# Patient Record
Sex: FEMALE | Race: ASIAN | NOT HISPANIC OR LATINO | Employment: OTHER | URBAN - METROPOLITAN AREA
[De-identification: names, ages, dates, MRNs, and addresses within clinical notes are randomized per-mention and may not be internally consistent; named-entity substitution may affect disease eponyms.]

---

## 2017-04-13 ENCOUNTER — HOSPITAL ENCOUNTER (OUTPATIENT)
Dept: LAB | Facility: MEDICAL CENTER | Age: 82
End: 2017-04-13
Attending: FAMILY MEDICINE
Payer: MEDICARE

## 2017-04-13 LAB
25(OH)D3 SERPL-MCNC: 7 NG/ML (ref 30–100)
ALBUMIN SERPL BCP-MCNC: 3.9 G/DL (ref 3.2–4.9)
ALBUMIN/GLOB SERPL: 1.3 G/DL
ALP SERPL-CCNC: 90 U/L (ref 30–99)
ALT SERPL-CCNC: 8 U/L (ref 2–50)
ANION GAP SERPL CALC-SCNC: 7 MMOL/L (ref 0–11.9)
AST SERPL-CCNC: 16 U/L (ref 12–45)
BASOPHILS # BLD AUTO: 0.2 % (ref 0–1.8)
BASOPHILS # BLD: 0.01 K/UL (ref 0–0.12)
BILIRUB SERPL-MCNC: 0.6 MG/DL (ref 0.1–1.5)
BUN SERPL-MCNC: 15 MG/DL (ref 8–22)
CALCIUM SERPL-MCNC: 9 MG/DL (ref 8.5–10.5)
CHLORIDE SERPL-SCNC: 106 MMOL/L (ref 96–112)
CHOLEST SERPL-MCNC: 198 MG/DL (ref 100–199)
CO2 SERPL-SCNC: 27 MMOL/L (ref 20–33)
CREAT SERPL-MCNC: 0.63 MG/DL (ref 0.5–1.4)
EOSINOPHIL # BLD AUTO: 0.12 K/UL (ref 0–0.51)
EOSINOPHIL NFR BLD: 2.4 % (ref 0–6.9)
ERYTHROCYTE [DISTWIDTH] IN BLOOD BY AUTOMATED COUNT: 45.7 FL (ref 35.9–50)
EST. AVERAGE GLUCOSE BLD GHB EST-MCNC: 154 MG/DL
GFR SERPL CREATININE-BSD FRML MDRD: >60 ML/MIN/1.73 M 2
GLOBULIN SER CALC-MCNC: 3 G/DL (ref 1.9–3.5)
GLUCOSE SERPL-MCNC: 136 MG/DL (ref 65–99)
HBA1C MFR BLD: 7 % (ref 0–5.6)
HCT VFR BLD AUTO: 43.5 % (ref 37–47)
HDLC SERPL-MCNC: 47 MG/DL
HGB BLD-MCNC: 14.2 G/DL (ref 12–16)
IMM GRANULOCYTES # BLD AUTO: 0.01 K/UL (ref 0–0.11)
IMM GRANULOCYTES NFR BLD AUTO: 0.2 % (ref 0–0.9)
LDLC SERPL CALC-MCNC: 130 MG/DL
LYMPHOCYTES # BLD AUTO: 1.42 K/UL (ref 1–4.8)
LYMPHOCYTES NFR BLD: 28.5 % (ref 22–41)
MCH RBC QN AUTO: 31.6 PG (ref 27–33)
MCHC RBC AUTO-ENTMCNC: 32.6 G/DL (ref 33.6–35)
MCV RBC AUTO: 96.9 FL (ref 81.4–97.8)
MONOCYTES # BLD AUTO: 0.41 K/UL (ref 0–0.85)
MONOCYTES NFR BLD AUTO: 8.2 % (ref 0–13.4)
NEUTROPHILS # BLD AUTO: 3.01 K/UL (ref 2–7.15)
NEUTROPHILS NFR BLD: 60.5 % (ref 44–72)
NRBC # BLD AUTO: 0 K/UL
NRBC BLD AUTO-RTO: 0 /100 WBC
PLATELET # BLD AUTO: 216 K/UL (ref 164–446)
PMV BLD AUTO: 10.5 FL (ref 9–12.9)
POTASSIUM SERPL-SCNC: 3.9 MMOL/L (ref 3.6–5.5)
PROT SERPL-MCNC: 6.9 G/DL (ref 6–8.2)
RBC # BLD AUTO: 4.49 M/UL (ref 4.2–5.4)
SODIUM SERPL-SCNC: 140 MMOL/L (ref 135–145)
TRIGL SERPL-MCNC: 107 MG/DL (ref 0–149)
WBC # BLD AUTO: 5 K/UL (ref 4.8–10.8)

## 2017-04-13 PROCEDURE — 85025 COMPLETE CBC W/AUTO DIFF WBC: CPT

## 2017-04-13 PROCEDURE — 80053 COMPREHEN METABOLIC PANEL: CPT

## 2017-04-13 PROCEDURE — 80061 LIPID PANEL: CPT

## 2017-04-13 PROCEDURE — 82306 VITAMIN D 25 HYDROXY: CPT

## 2017-04-13 PROCEDURE — 83036 HEMOGLOBIN GLYCOSYLATED A1C: CPT

## 2017-04-13 PROCEDURE — 36415 COLL VENOUS BLD VENIPUNCTURE: CPT

## 2017-09-05 ENCOUNTER — APPOINTMENT (OUTPATIENT)
Dept: SOCIAL WORK | Facility: CLINIC | Age: 82
End: 2017-09-05
Payer: MEDICARE

## 2017-09-05 PROCEDURE — 90662 IIV NO PRSV INCREASED AG IM: CPT | Performed by: REGISTERED NURSE

## 2017-09-05 PROCEDURE — G0008 ADMIN INFLUENZA VIRUS VAC: HCPCS | Performed by: REGISTERED NURSE

## 2017-10-09 ENCOUNTER — HOSPITAL ENCOUNTER (OUTPATIENT)
Dept: LAB | Facility: MEDICAL CENTER | Age: 82
End: 2017-10-09
Attending: FAMILY MEDICINE
Payer: MEDICARE

## 2017-10-09 LAB
25(OH)D3 SERPL-MCNC: 9 NG/ML (ref 30–100)
ALBUMIN SERPL BCP-MCNC: 4 G/DL (ref 3.2–4.9)
ALBUMIN/GLOB SERPL: 1.2 G/DL
ALP SERPL-CCNC: 80 U/L (ref 30–99)
ALT SERPL-CCNC: 11 U/L (ref 2–50)
ANION GAP SERPL CALC-SCNC: 6 MMOL/L (ref 0–11.9)
AST SERPL-CCNC: 18 U/L (ref 12–45)
BILIRUB SERPL-MCNC: 0.8 MG/DL (ref 0.1–1.5)
BUN SERPL-MCNC: 16 MG/DL (ref 8–22)
CALCIUM SERPL-MCNC: 9.5 MG/DL (ref 8.5–10.5)
CHLORIDE SERPL-SCNC: 104 MMOL/L (ref 96–112)
CO2 SERPL-SCNC: 29 MMOL/L (ref 20–33)
CREAT SERPL-MCNC: 0.57 MG/DL (ref 0.5–1.4)
EST. AVERAGE GLUCOSE BLD GHB EST-MCNC: 160 MG/DL
GFR SERPL CREATININE-BSD FRML MDRD: >60 ML/MIN/1.73 M 2
GLOBULIN SER CALC-MCNC: 3.4 G/DL (ref 1.9–3.5)
GLUCOSE SERPL-MCNC: 128 MG/DL (ref 65–99)
HBA1C MFR BLD: 7.2 % (ref 0–5.6)
POTASSIUM SERPL-SCNC: 3.8 MMOL/L (ref 3.6–5.5)
PROT SERPL-MCNC: 7.4 G/DL (ref 6–8.2)
SODIUM SERPL-SCNC: 139 MMOL/L (ref 135–145)
TSH SERPL DL<=0.005 MIU/L-ACNC: 2.53 UIU/ML (ref 0.3–3.7)

## 2017-10-09 PROCEDURE — 83036 HEMOGLOBIN GLYCOSYLATED A1C: CPT

## 2017-10-09 PROCEDURE — 36415 COLL VENOUS BLD VENIPUNCTURE: CPT

## 2017-10-09 PROCEDURE — 84443 ASSAY THYROID STIM HORMONE: CPT

## 2017-10-09 PROCEDURE — 80053 COMPREHEN METABOLIC PANEL: CPT

## 2017-10-09 PROCEDURE — 82306 VITAMIN D 25 HYDROXY: CPT

## 2017-12-09 ENCOUNTER — HOSPITAL ENCOUNTER (EMERGENCY)
Facility: MEDICAL CENTER | Age: 82
End: 2017-12-09
Attending: EMERGENCY MEDICINE
Payer: MEDICARE

## 2017-12-09 ENCOUNTER — APPOINTMENT (OUTPATIENT)
Dept: RADIOLOGY | Facility: MEDICAL CENTER | Age: 82
End: 2017-12-09
Attending: EMERGENCY MEDICINE
Payer: MEDICARE

## 2017-12-09 VITALS
WEIGHT: 101.41 LBS | RESPIRATION RATE: 18 BRPM | OXYGEN SATURATION: 98 % | BODY MASS INDEX: 19.15 KG/M2 | TEMPERATURE: 98 F | HEIGHT: 61 IN | DIASTOLIC BLOOD PRESSURE: 78 MMHG | HEART RATE: 63 BPM | SYSTOLIC BLOOD PRESSURE: 115 MMHG

## 2017-12-09 DIAGNOSIS — S20.212A CHEST WALL CONTUSION, LEFT, INITIAL ENCOUNTER: ICD-10-CM

## 2017-12-09 PROCEDURE — 99284 EMERGENCY DEPT VISIT MOD MDM: CPT

## 2017-12-09 PROCEDURE — 71101 X-RAY EXAM UNILAT RIBS/CHEST: CPT | Mod: RT

## 2017-12-09 RX ORDER — HYDROCODONE BITARTRATE AND ACETAMINOPHEN 5; 325 MG/1; MG/1
1-2 TABLET ORAL EVERY 6 HOURS PRN
Qty: 20 TAB | Refills: 0 | Status: SHIPPED | OUTPATIENT
Start: 2017-12-09 | End: 2017-12-21

## 2017-12-09 RX ORDER — DONEPEZIL HYDROCHLORIDE 5 MG/1
5 TABLET, FILM COATED ORAL DAILY
Status: SHIPPED | COMMUNITY
End: 2017-12-09

## 2017-12-09 ASSESSMENT — PAIN SCALES - GENERAL
PAINLEVEL_OUTOF10: 1
PAINLEVEL_OUTOF10: 5

## 2017-12-09 NOTE — ED NOTES
The Medication Reconciliation process has been completed by interviewing the patient's family and a call to the pharmacy where she hasn't filled her metformin since May or the Cerefolin since October but they state that she is taking them.    Allergies have been reviewed  Antibiotic use in 30 days - none    Home Pharmacy:  [facility] [place]

## 2017-12-09 NOTE — DISCHARGE INSTRUCTIONS
Chest Contusion  A contusion is a deep bruise. Bruises happen when an injury causes bleeding under the skin. Signs of bruising include pain, puffiness (swelling), and discolored skin. The bruise may turn blue, purple, or yellow.   HOME CARE  · Put ice on the injured area.  ¨ Put ice in a plastic bag.  ¨ Place a towel between the skin and the bag.  ¨ Leave the ice on for 15-20 minutes at a time, 03-04 times a day for the first 48 hours.  · Only take medicine as told by your doctor.  · Rest.  · Take deep breaths (deep-breathing exercises) as told by your doctor.  · Stop smoking if you smoke.  · Do not lift objects over 5 pounds (2.3 kilograms) for 3 days or longer if told by your doctor.  GET HELP RIGHT AWAY IF:   · You have more bruising or puffiness.  · You have pain that gets worse.  · You have trouble breathing.  · You are dizzy, weak, or pass out (faint).  · You have blood in your pee (urine) or poop (stool).  · You cough up or throw up (vomit) blood.  · Your puffiness or pain is not helped with medicines.  MAKE SURE YOU:   · Understand these instructions.  · Will watch your condition.  · Will get help right away if you are not doing well or get worse.     This information is not intended to replace advice given to you by your health care provider. Make sure you discuss any questions you have with your health care provider.     Document Released: 06/05/2009 Document Revised: 09/11/2013 Document Reviewed: 06/10/2013  [employer] Interactive Patient Education ©2016 [employer]

## 2017-12-09 NOTE — ED PROVIDER NOTES
ED Provider Note    CHIEF COMPLAINT  Chief Complaint   Patient presents with   • Chest Wall Pain   • GLF        HPI  Daniel Aleman is a 89 y.o. female who presentsTo the ED with complaints of right chest wall pain. About 2 days ago. The patient was up, stumbled and fell and hit her back on a dresser drawer. Since then, she's been having increasing pain primarily around the posterior aspect of the mid chest wall. There is no shortness of breath, no fevers, no chills, or any other symptoms. Patient apparently has a slight amount of dementia. Per the . She is a poor historian. Patient is alert to person, place but not time or events.    REVIEW OF SYSTEMS  See HPI for further details. All other systems are negative.     PAST MEDICAL HISTORY  Past Medical History:   Diagnosis Date   • High Cholesterol    • Trigeminal Neuralgia        FAMILY HISTORY  No family history on file.  Patient's family history has been discussed and is been found to be noncontributory to his present illness  SOCIAL HISTORY  Social History     Social History   • Marital status:      Spouse name: N/A   • Number of children: N/A   • Years of education: N/A     Social History Main Topics   • Smoking status: Never Smoker   • Smokeless tobacco: Not on file   • Alcohol use No   • Drug use: No   • Sexual activity: Not on file     Other Topics Concern   • Not on file     Social History Narrative   • No narrative on file      Daisy Espinoza M.D.    SURGICAL HISTORY  Past Surgical History:   Procedure Laterality Date   • GYN SURGERY       x 3       CURRENT MEDICATIONS  Home Medications     Reviewed by Paco Rivera (Pharmacy Tech) on 17 at 1146  Med List Status: Complete   Medication Last Dose Status   metformin (GLUCOPHAGE) 500 MG Tab 2017 Active   Rwzqvtxqa-Opaxlvwpf-Wknhbleiev (CEREFOLIN NAC PO) 2017 Active              No current facility-administered medications on file prior to encounter.      No current  "outpatient prescriptions on file prior to encounter.         ALLERGIES  No Known Allergies    PHYSICAL EXAM  VITAL SIGNS: /63   Pulse 63   Temp 36.7 °C (98 °F)   Resp 20   Ht 1.549 m (5' 1\")   Wt 46 kg (101 lb 6.6 oz)   SpO2 97%   BMI 19.16 kg/m²    Pulse Oximetry was obtained. It showed a reading of Pulse Oximetry: 96 %.  I interpreted this as not hypoxic.     Constitutional: Well developed, Well nourished, No acute distress, Non-toxic appearance.   HENT: Normocephalic, Atraumatic, Bilateral external ears normal, bilateral tympanic membranes normal, Oropharynx moist, No oral exudates, Nose normal.   Eyes: Pupils are equal round and react to light, extraocular motions are intact, conjunctiva is normal, there are no signs of exudate.   Neck: Supple, no cervical lymphadenopathy, no meningeal signs..   Lymphatic: No lymphadenopathy noted.   Cardiovascular: Regular rate and rhythm without murmurs gallops or rubs.   Thorax & Lungs: Lungs are clear to auscultation bilaterally, there are no wheezes no rales. Chest wall is tender about the right mid aspect posterior axillary line around the 5th, 6th, 7th ribs region. There is no crepitance felt. Breath sounds are equal   Abdomen: Soft, nontender nondistended. Bowel sounds are present.   Skin: Warm, Dry, No erythema,   Back: No tenderness, No CVA tenderness.   Musculoskeletal: Good range of motion in all major joints. No tenderness to palpation or major deformities noted. Intact distal pulses, no clubbing, no cyanosis, no edema,   Neurologic: Alert & oriented x 2. Apparently baseline, Normal motor function, Normal sensory function, No focal deficits noted.         RADIOLOGY/PROCEDURES  AN-QOJW-RGPNVTSJKT (WITH 1-VIEW CXR) RIGHT   Final Result      1.  No rib fracture identified      2.  Mild bilateral atelectasis      3.  Small right pleural effusion            COURSE & MEDICAL DECISION MAKING  Pertinent Labs & Imaging studies reviewed. (See chart for " details)  Patient presents for evaluation. Clinically, she does have some mild tenderness to the right aspect, but there is no crepitance. Chest x-ray there is a small, very small pleural effusion on the right sides. Possible at this is just fluid collection from an injury. There is no overt signs of fractures, fracture cannot be completely ruled out. At this point will treat with pain control. Recommended deep breathing exercises. Follow-up primary care physician 1 week for recheck, return as needed    FINAL IMPRESSION  1. Chest wall contusion, left, initial encounter      I reviewed prescription monitoring program for patient's narcotic use before prescribing a scheduled drug.The patient will not drink alcohol nor drive with prescribed medications. The patient will return for new or worsening symptoms and is stable at the time of discharge.    The patient is referred to a primary physician for blood pressure management, diabetic screening, and for all other preventative health concerns.    DISPOSITION:  Patient will be discharged home in stable condition.    FOLLOW UP:  Daisy Espinoza M.D.  [address]  [place] [address]  [phone]    Schedule an appointment as soon as possible for a visit in 1 week  As needed, Return if any symptoms worsen      OUTPATIENT MEDICATIONS:  New Prescriptions    HYDROCODONE-ACETAMINOPHEN (NORCO) 5-325 MG TAB PER TABLET    Take 1-2 Tabs by mouth every 6 hours as needed.         It was noticed that the patient's blood pressure was greater than 120/80. On today's visit. At this point, most likely related to reactive hypertension, secondary to the emergency visit itself. I have Recommend the patient followup with their primary care physician for recheck of her blood pressure.      Electronically signed by: Tang Burroughs, 12/9/2017 11:05 AM

## 2017-12-09 NOTE — ED NOTES
Pt BIB  c/o tripping and hitting her R back against a dresser 2 days ago. Pt denies LOC. Pt c/o pain to R back, R to lateral chest wall. Pt denies SOB.

## 2017-12-21 ENCOUNTER — HOSPITAL ENCOUNTER (EMERGENCY)
Facility: MEDICAL CENTER | Age: 82
End: 2017-12-21
Attending: EMERGENCY MEDICINE
Payer: MEDICARE

## 2017-12-21 VITALS
BODY MASS INDEX: 19.04 KG/M2 | WEIGHT: 100.75 LBS | DIASTOLIC BLOOD PRESSURE: 67 MMHG | OXYGEN SATURATION: 93 % | HEART RATE: 85 BPM | RESPIRATION RATE: 16 BRPM | SYSTOLIC BLOOD PRESSURE: 160 MMHG | TEMPERATURE: 98.2 F

## 2017-12-21 DIAGNOSIS — K59.00 CONSTIPATION, UNSPECIFIED CONSTIPATION TYPE: ICD-10-CM

## 2017-12-21 PROCEDURE — 99283 EMERGENCY DEPT VISIT LOW MDM: CPT

## 2017-12-21 RX ORDER — GLUCOSAMINE HCL 500 MG
1 TABLET ORAL DAILY
Status: SHIPPED | COMMUNITY
End: 2020-08-03

## 2017-12-21 ASSESSMENT — PAIN SCALES - GENERAL: PAINLEVEL_OUTOF10: 5

## 2017-12-21 NOTE — ED NOTES
Pt had good relief after enema. D/c pt home, . Pt  And family aware of f/u instructions , aware to return for any changes or concerns. No further questions upon d/c home from ed

## 2017-12-21 NOTE — DISCHARGE INSTRUCTIONS
Constipation, Adult  Constipation is when a person has fewer than three bowel movements a week, has difficulty having a bowel movement, or has stools that are dry, hard, or larger than normal. As people grow older, constipation is more common. A low-fiber diet, not taking in enough fluids, and taking certain medicines may make constipation worse.   CAUSES   · Certain medicines, such as antidepressants, pain medicine, iron supplements, antacids, and water pills.    · Certain diseases, such as diabetes, irritable bowel syndrome (IBS), thyroid disease, or depression.    · Not drinking enough water.    · Not eating enough fiber-rich foods.    · Stress or travel.    · Lack of physical activity or exercise.    · Ignoring the urge to have a bowel movement.    · Using laxatives too much.    SIGNS AND SYMPTOMS   · Having fewer than three bowel movements a week.    · Straining to have a bowel movement.    · Having stools that are hard, dry, or larger than normal.    · Feeling full or bloated.    · Pain in the lower abdomen.    · Not feeling relief after having a bowel movement.    DIAGNOSIS   Your health care provider will take a medical history and perform a physical exam. Further testing may be done for severe constipation. Some tests may include:  · A barium enema X-ray to examine your rectum, colon, and, sometimes, your small intestine.    · A sigmoidoscopy to examine your lower colon.    · A colonoscopy to examine your entire colon.  TREATMENT   Treatment will depend on the severity of your constipation and what is causing it. Some dietary treatments include drinking more fluids and eating more fiber-rich foods. Lifestyle treatments may include regular exercise. If these diet and lifestyle recommendations do not help, your health care provider may recommend taking over-the-counter laxative medicines to help you have bowel movements. Prescription medicines may be prescribed if over-the-counter medicines do not work.    HOME CARE INSTRUCTIONS   · Eat foods that have a lot of fiber, such as fruits, vegetables, whole grains, and beans.  · Limit foods high in fat and processed sugars, such as french fries, hamburgers, cookies, candies, and soda.    · A fiber supplement may be added to your diet if you cannot get enough fiber from foods.    · Drink enough fluids to keep your urine clear or pale yellow.    · Exercise regularly or as directed by your health care provider.    · Go to the restroom when you have the urge to go. Do not hold it.    · Only take over-the-counter or prescription medicines as directed by your health care provider. Do not take other medicines for constipation without talking to your health care provider first.    SEEK IMMEDIATE MEDICAL CARE IF:   · You have bright red blood in your stool.    · Your constipation lasts for more than 4 days or gets worse.    · You have abdominal or rectal pain.    · You have thin, pencil-like stools.    · You have unexplained weight loss.  MAKE SURE YOU:   · Understand these instructions.  · Will watch your condition.  · Will get help right away if you are not doing well or get worse.     This information is not intended to replace advice given to you by your health care provider. Make sure you discuss any questions you have with your health care provider.     Document Released: 09/15/2005 Document Revised: 01/08/2016 Document Reviewed: 09/29/2014  [employer] Interactive Patient Education ©2016 [employer]    Fiber Content in Foods  Drinking plenty of fluids and consuming foods high in fiber can help with constipation. See the list below for the fiber content of some common foods.  Starches and Grains / Dietary Fiber (g)  · Cheerios, 1 cup / 3 g  · Barnard's Corn Flakes, 1 cup / 0.7 g  · Rice Krispies, 1 ¼ cup / 0.3 g  · [employer] Oat Life Cereal, ¾ cup / 2.1 g  · Oatmeal, instant (cooked), ½ cup / 2 g  · Chanelle's Frosted Mini Wheats, 1 cup / 5.1 g  · Rice, brown, long-grain  (cooked), 1 cup / 3.5 g  · Rice, white, long-grain (cooked), 1 cup / 0.6 g  · Macaroni, cooked, enriched, 1 cup / 2.5 g  Legumes / Dietary Fiber (g)  · Beans, baked, canned, plain or vegetarian, ½ cup / 5.2 g  · Beans, kidney, canned, ½ cup / 6.8 g  · Beans, sutherland, dried (cooked), ½ cup / 7.7 g  · Beans, sutherland, canned, ½ cup / 5.5 g  Breads and Crackers / Dietary Fiber (g)  · Harrison crackers, plain or honey, 2 squares / 0.7 g  · Saltine crackers, 3 squares / 0.3 g  · Pretzels, plain, salted, 10 pieces / 1.8 g  · Bread, whole-wheat, 1 slice / 1.9 g  · Bread, white, 1 slice / 0.7 g  · Bread, raisin, 1 slice / 1.2 g  · Bagel, plain, 3 oz / 2 g  · Tortilla, flour, 1 oz / 0.9 g  · Tortilla, corn, 1 small / 1.5 g  · Bun, hamburger or hotdog, 1 small / 0.9 g  Fruits / Dietary Fiber (g)  · Apple, raw with skin, 1 medium / 4.4 g  · Applesauce, sweetened, ½ cup / 1.5 g  · Banana, ½ medium / 1.5 g  · Grapes, 10 grapes / 0.4 g  · Orange, 1 small / 2.3 g  · Raisin, 1.5 oz / 1.6 g  · Melon, 1 cup / 1.4 g  Vegetables / Dietary Fiber (g)  · Green beans, canned, ½ cup / 1.3 g  · Carrots (cooked), ½ cup / 2.3 g  · Broccoli (cooked), ½ cup / 2.8 g  · Peas, frozen (cooked), ½ cup / 4.4 g  · Potatoes, mashed, ½ cup / 1.6 g  · Lettuce, 1 cup / 0.5 g  · Corn, canned, ½ cup / 1.6 g  · Tomato, ½ cup / 1.1 g  Document Released: 05/05/2008 Document Revised: 03/11/2013 Document Reviewed: 06/30/2008  [employer]® Patient Information ©2014 [employer].

## 2018-04-10 ENCOUNTER — HOSPITAL ENCOUNTER (OUTPATIENT)
Dept: LAB | Facility: MEDICAL CENTER | Age: 83
End: 2018-04-10
Attending: FAMILY MEDICINE
Payer: MEDICARE

## 2018-04-10 LAB
25(OH)D3 SERPL-MCNC: 52 NG/ML (ref 30–100)
BASOPHILS # BLD AUTO: 0.7 % (ref 0–1.8)
BASOPHILS # BLD: 0.04 K/UL (ref 0–0.12)
EOSINOPHIL # BLD AUTO: 0.13 K/UL (ref 0–0.51)
EOSINOPHIL NFR BLD: 2.3 % (ref 0–6.9)
ERYTHROCYTE [DISTWIDTH] IN BLOOD BY AUTOMATED COUNT: 49.5 FL (ref 35.9–50)
EST. AVERAGE GLUCOSE BLD GHB EST-MCNC: 134 MG/DL
HBA1C MFR BLD: 6.3 % (ref 0–5.6)
HCT VFR BLD AUTO: 43 % (ref 37–47)
HGB BLD-MCNC: 13.7 G/DL (ref 12–16)
IMM GRANULOCYTES # BLD AUTO: 0.01 K/UL (ref 0–0.11)
IMM GRANULOCYTES NFR BLD AUTO: 0.2 % (ref 0–0.9)
LYMPHOCYTES # BLD AUTO: 1.77 K/UL (ref 1–4.8)
LYMPHOCYTES NFR BLD: 31.5 % (ref 22–41)
MCH RBC QN AUTO: 31.9 PG (ref 27–33)
MCHC RBC AUTO-ENTMCNC: 31.9 G/DL (ref 33.6–35)
MCV RBC AUTO: 100 FL (ref 81.4–97.8)
MONOCYTES # BLD AUTO: 0.48 K/UL (ref 0–0.85)
MONOCYTES NFR BLD AUTO: 8.5 % (ref 0–13.4)
NEUTROPHILS # BLD AUTO: 3.19 K/UL (ref 2–7.15)
NEUTROPHILS NFR BLD: 56.8 % (ref 44–72)
NRBC # BLD AUTO: 0 K/UL
NRBC BLD-RTO: 0 /100 WBC
PLATELET # BLD AUTO: 196 K/UL (ref 164–446)
PMV BLD AUTO: 10.6 FL (ref 9–12.9)
RBC # BLD AUTO: 4.3 M/UL (ref 4.2–5.4)
T4 FREE SERPL-MCNC: 0.91 NG/DL (ref 0.53–1.43)
TSH SERPL DL<=0.005 MIU/L-ACNC: 1.85 UIU/ML (ref 0.38–5.33)
WBC # BLD AUTO: 5.6 K/UL (ref 4.8–10.8)

## 2018-04-10 PROCEDURE — 84443 ASSAY THYROID STIM HORMONE: CPT

## 2018-04-10 PROCEDURE — 84439 ASSAY OF FREE THYROXINE: CPT

## 2018-04-10 PROCEDURE — 36415 COLL VENOUS BLD VENIPUNCTURE: CPT

## 2018-04-10 PROCEDURE — 85025 COMPLETE CBC W/AUTO DIFF WBC: CPT

## 2018-04-10 PROCEDURE — 83036 HEMOGLOBIN GLYCOSYLATED A1C: CPT

## 2018-04-10 PROCEDURE — 82306 VITAMIN D 25 HYDROXY: CPT

## 2018-04-10 PROCEDURE — 80053 COMPREHEN METABOLIC PANEL: CPT

## 2018-04-11 LAB
ALBUMIN SERPL BCP-MCNC: 4.3 G/DL (ref 3.2–4.9)
ALBUMIN/GLOB SERPL: 1.9 G/DL
ALP SERPL-CCNC: 72 U/L (ref 30–99)
ALT SERPL-CCNC: 8 U/L (ref 2–50)
ANION GAP SERPL CALC-SCNC: 6 MMOL/L (ref 0–11.9)
AST SERPL-CCNC: 18 U/L (ref 12–45)
BILIRUB SERPL-MCNC: 0.5 MG/DL (ref 0.1–1.5)
BUN SERPL-MCNC: 21 MG/DL (ref 8–22)
CALCIUM SERPL-MCNC: 9.4 MG/DL (ref 8.5–10.5)
CHLORIDE SERPL-SCNC: 106 MMOL/L (ref 96–112)
CO2 SERPL-SCNC: 28 MMOL/L (ref 20–33)
CREAT SERPL-MCNC: 0.74 MG/DL (ref 0.5–1.4)
GLOBULIN SER CALC-MCNC: 2.3 G/DL (ref 1.9–3.5)
GLUCOSE SERPL-MCNC: 111 MG/DL (ref 65–99)
POTASSIUM SERPL-SCNC: 3.8 MMOL/L (ref 3.6–5.5)
PROT SERPL-MCNC: 6.6 G/DL (ref 6–8.2)
SODIUM SERPL-SCNC: 140 MMOL/L (ref 135–145)

## 2018-09-25 ENCOUNTER — HOSPITAL ENCOUNTER (OUTPATIENT)
Dept: LAB | Facility: MEDICAL CENTER | Age: 83
End: 2018-09-25
Attending: FAMILY MEDICINE
Payer: MEDICARE

## 2018-09-25 LAB
ALBUMIN SERPL BCP-MCNC: 3.5 G/DL (ref 3.2–4.9)
ALBUMIN/GLOB SERPL: 1 G/DL
ALP SERPL-CCNC: 71 U/L (ref 30–99)
ALT SERPL-CCNC: 8 U/L (ref 2–50)
ANION GAP SERPL CALC-SCNC: 5 MMOL/L (ref 0–11.9)
AST SERPL-CCNC: 14 U/L (ref 12–45)
BASOPHILS # BLD AUTO: 0.6 % (ref 0–1.8)
BASOPHILS # BLD: 0.03 K/UL (ref 0–0.12)
BILIRUB SERPL-MCNC: 0.5 MG/DL (ref 0.1–1.5)
BUN SERPL-MCNC: 16 MG/DL (ref 8–22)
CALCIUM SERPL-MCNC: 9.5 MG/DL (ref 8.5–10.5)
CHLORIDE SERPL-SCNC: 106 MMOL/L (ref 96–112)
CHOLEST SERPL-MCNC: 171 MG/DL (ref 100–199)
CO2 SERPL-SCNC: 32 MMOL/L (ref 20–33)
CREAT SERPL-MCNC: 0.64 MG/DL (ref 0.5–1.4)
CREAT UR-MCNC: 31 MG/DL
EOSINOPHIL # BLD AUTO: 0.15 K/UL (ref 0–0.51)
EOSINOPHIL NFR BLD: 3.1 % (ref 0–6.9)
ERYTHROCYTE [DISTWIDTH] IN BLOOD BY AUTOMATED COUNT: 46.8 FL (ref 35.9–50)
EST. AVERAGE GLUCOSE BLD GHB EST-MCNC: 143 MG/DL
FASTING STATUS PATIENT QL REPORTED: NORMAL
GLOBULIN SER CALC-MCNC: 3.5 G/DL (ref 1.9–3.5)
GLUCOSE SERPL-MCNC: 106 MG/DL (ref 65–99)
HBA1C MFR BLD: 6.6 % (ref 0–5.6)
HCT VFR BLD AUTO: 40.2 % (ref 37–47)
HDLC SERPL-MCNC: 51 MG/DL
HGB BLD-MCNC: 13.2 G/DL (ref 12–16)
IMM GRANULOCYTES # BLD AUTO: 0.02 K/UL (ref 0–0.11)
IMM GRANULOCYTES NFR BLD AUTO: 0.4 % (ref 0–0.9)
LDLC SERPL CALC-MCNC: 101 MG/DL
LYMPHOCYTES # BLD AUTO: 1.54 K/UL (ref 1–4.8)
LYMPHOCYTES NFR BLD: 32.3 % (ref 22–41)
MCH RBC QN AUTO: 32.3 PG (ref 27–33)
MCHC RBC AUTO-ENTMCNC: 32.8 G/DL (ref 33.6–35)
MCV RBC AUTO: 98.3 FL (ref 81.4–97.8)
MICROALBUMIN UR-MCNC: <0.7 MG/DL
MICROALBUMIN/CREAT UR: NORMAL MG/G (ref 0–30)
MONOCYTES # BLD AUTO: 0.42 K/UL (ref 0–0.85)
MONOCYTES NFR BLD AUTO: 8.8 % (ref 0–13.4)
NEUTROPHILS # BLD AUTO: 2.61 K/UL (ref 2–7.15)
NEUTROPHILS NFR BLD: 54.8 % (ref 44–72)
NRBC # BLD AUTO: 0 K/UL
NRBC BLD-RTO: 0 /100 WBC
PLATELET # BLD AUTO: 191 K/UL (ref 164–446)
PMV BLD AUTO: 11 FL (ref 9–12.9)
POTASSIUM SERPL-SCNC: 3.7 MMOL/L (ref 3.6–5.5)
PROT SERPL-MCNC: 7 G/DL (ref 6–8.2)
RBC # BLD AUTO: 4.09 M/UL (ref 4.2–5.4)
SODIUM SERPL-SCNC: 143 MMOL/L (ref 135–145)
T4 FREE SERPL-MCNC: 0.81 NG/DL (ref 0.53–1.43)
TRIGL SERPL-MCNC: 96 MG/DL (ref 0–149)
TSH SERPL DL<=0.005 MIU/L-ACNC: 2.98 UIU/ML (ref 0.38–5.33)
WBC # BLD AUTO: 4.8 K/UL (ref 4.8–10.8)

## 2018-09-25 PROCEDURE — 82570 ASSAY OF URINE CREATININE: CPT

## 2018-09-25 PROCEDURE — 82043 UR ALBUMIN QUANTITATIVE: CPT

## 2018-09-25 PROCEDURE — 36415 COLL VENOUS BLD VENIPUNCTURE: CPT

## 2018-09-25 PROCEDURE — 80053 COMPREHEN METABOLIC PANEL: CPT

## 2018-09-25 PROCEDURE — 84443 ASSAY THYROID STIM HORMONE: CPT

## 2018-09-25 PROCEDURE — 83036 HEMOGLOBIN GLYCOSYLATED A1C: CPT

## 2018-09-25 PROCEDURE — 80061 LIPID PANEL: CPT

## 2018-09-25 PROCEDURE — 84439 ASSAY OF FREE THYROXINE: CPT

## 2018-09-25 PROCEDURE — 85025 COMPLETE CBC W/AUTO DIFF WBC: CPT

## 2018-12-10 ENCOUNTER — DOCUMENTATION (OUTPATIENT)
Dept: CARDIOLOGY | Facility: MEDICAL CENTER | Age: 83
End: 2018-12-10

## 2018-12-10 NOTE — PROGRESS NOTES
Request sent to Dr. Espinoza's office requesting recent OV note and all cardiac testing on file for pt. Under media tab pt was seen in 2009 by SW filed under [employer].

## 2018-12-12 ENCOUNTER — OFFICE VISIT (OUTPATIENT)
Dept: CARDIOLOGY | Facility: MEDICAL CENTER | Age: 83
End: 2018-12-12
Payer: MEDICARE

## 2018-12-12 VITALS
DIASTOLIC BLOOD PRESSURE: 60 MMHG | HEART RATE: 68 BPM | WEIGHT: 105 LBS | RESPIRATION RATE: 16 BRPM | OXYGEN SATURATION: 93 % | BODY MASS INDEX: 20.62 KG/M2 | SYSTOLIC BLOOD PRESSURE: 140 MMHG | HEIGHT: 60 IN

## 2018-12-12 DIAGNOSIS — R94.31 NONSPECIFIC ABNORMAL ELECTROCARDIOGRAM (ECG) (EKG): ICD-10-CM

## 2018-12-12 DIAGNOSIS — R01.1 HEART MURMUR: ICD-10-CM

## 2018-12-12 PROBLEM — R73.03 PRE-DIABETES: Status: ACTIVE | Noted: 2018-12-12

## 2018-12-12 PROCEDURE — 99204 OFFICE O/P NEW MOD 45 MIN: CPT | Performed by: INTERNAL MEDICINE

## 2018-12-12 RX ORDER — CALCIUM POLYCARBOPHIL 625 MG 625 MG/1
625 TABLET ORAL DAILY
COMMUNITY
End: 2020-08-03

## 2018-12-12 ASSESSMENT — ENCOUNTER SYMPTOMS
DOUBLE VISION: 0
BLOOD IN STOOL: 0
NECK PAIN: 0
ORTHOPNEA: 0
MEMORY LOSS: 1
WEAKNESS: 0
FOCAL WEAKNESS: 0
SHORTNESS OF BREATH: 0
NERVOUS/ANXIOUS: 0
SENSORY CHANGE: 0
WEIGHT LOSS: 0
BACK PAIN: 0
HEADACHES: 0
VOMITING: 0
DEPRESSION: 0
NAUSEA: 0
PALPITATIONS: 0
BRUISES/BLEEDS EASILY: 0

## 2018-12-12 NOTE — LETTER
[facility] [place] [facility]   [address]  [place] [address]  Phone: [phone]  Fax: [phone]              Daniel Aleman  1927    Encounter Date: 2018    Chad Aguero M.D.          PROGRESS NOTE:  Chief Complaint   Patient presents with   • Abnormal EKG       Subjective:   Daniel Aleman is a 89 y.o. female who presents today for evaluation of abnormal EKG    She is seen in consultation at the request of Dr. Daisy Espinoza for above issue.  She reports no known major medical problem.  She was recently seen by her primary care provider for a routine medical checkup.  An electrocardiogram was performed and there was concern about possible septal infarct.  The patient denies any cardiac symptoms.  She is somewhat sedentary but denies chest discomfort or shortness of breath.  She was seen by cardiologist in  for chest pain.  Electrocardiogram reportedly showed nonspecific inferior Q waves and relatively tall R waves in the anterior precordial leads lasting some concern about inferior wall infarct with posterior wall extension.  She subsequently underwent an exercise stress test which was negative.  She also underwent echocardiography at that time which was unremarkable.    Past Medical History:   Diagnosis Date   • High cholesterol    • Trigeminal neuralgia      Past Surgical History:   Procedure Laterality Date   • GYN SURGERY       x 3   • PRIMARY C SECTION           Family History   Problem Relation Age of Onset   • Heart Disease Neg Hx      Social History     Social History   • Marital status:      Spouse name: N/A   • Number of children: N/A   • Years of education: N/A     Occupational History   • Not on file.     Social History Main Topics   • Smoking status: Never Smoker   • Smokeless tobacco: Never Used   • Alcohol use No   • Drug use: No   • Sexual activity: Not on file     Other Topics Concern   • Not on file          Social History Narrative   • No narrative on file     Not on File  Outpatient Encounter Prescriptions as of 12/12/2018   Medication Sig Dispense Refill   • calcium polycarbophil (FIBERCON) 625 MG Tab Take 625 mg by mouth every day.     • Cholecalciferol (VITAMIN D3) 3000 units Tab Take 1 Cap by mouth every day.     • metformin (GLUCOPHAGE) 500 MG Tab Take 500 mg by mouth every day.     • Yfarqnddq-Xitumoydd-Nmbvgkmbjw (CEREFOLIN NAC PO) Take 1 Tab by mouth 1 time daily as needed.       No facility-administered encounter medications on file as of 12/12/2018.      Review of Systems   Constitutional: Negative for malaise/fatigue and weight loss.   HENT: Positive for hearing loss. Negative for congestion, nosebleeds and tinnitus.    Eyes: Negative for double vision.   Respiratory: Negative for shortness of breath.    Cardiovascular: Negative for chest pain, palpitations, orthopnea and leg swelling.   Gastrointestinal: Negative for blood in stool, nausea and vomiting.   Genitourinary: Negative for frequency and hematuria.   Musculoskeletal: Negative for back pain and neck pain.   Skin: Negative for rash.   Neurological: Negative for sensory change, focal weakness, weakness and headaches.   Endo/Heme/Allergies: Does not bruise/bleed easily.   Psychiatric/Behavioral: Positive for memory loss. Negative for depression. The patient is not nervous/anxious.    All other systems reviewed and are negative.       Objective:   /60 (BP Location: Right arm)   Pulse 68   Resp 16   Ht 1.524 m (5')   Wt 47.6 kg (105 lb)   SpO2 93%   BMI 20.51 kg/m²      Physical Exam   Constitutional: She is oriented to person, place, and time. No distress.   HENT:   Head: Normocephalic and atraumatic.   Eyes: EOM are normal.   Neck: No JVD present. No thyromegaly present.   Cardiovascular: Normal rate and regular rhythm.  Exam reveals no gallop.    Murmur heard.   Systolic murmur is present with a grade of 2/6   RUSB   Pulmonary/Chest:  Effort normal. No respiratory distress. She has no wheezes. She has no rales.   Abdominal: Soft. There is no tenderness.   Musculoskeletal: She exhibits no edema or deformity.   Neurological: She is alert and oriented to person, place, and time.   Skin: Skin is warm. No erythema.   Psychiatric: Her behavior is normal.     I did review her electrocardiogram from November 19 the QRS complex in lead V2 was very small. Tthere was no definite Q wave but the entire QS complex was only 1.5-2 mm.  There was late transition and low voltage in the limb leads without other significant Q waves noted.    Assessment:     1. Nonspecific abnormal electrocardiogram (ECG) (EKG)  EC-ECHOCARDIOGRAM COMPLETE W/O CONT   2. Pre-diabetes     3. Heart murmur  EC-ECHOCARDIOGRAM COMPLETE W/O CONT       Medical Decision Making:  Today's Assessment / Status / Plan:     I believe the finding on her EKG is nonspecific, probably age related pulmonary emphysema rather than prior septal infarct. With heart murmur and her age, will obtain echocardiography to assess wall motion and aortic valve.  I did not start her on a new medication.  We will keep you posted about our finding and further recommendation as it becomes available.  Thank you for allowing us to participate in the care of this patient.        No Recipients

## 2018-12-12 NOTE — PROGRESS NOTES
Chief Complaint   Patient presents with   • Abnormal EKG       Subjective:   Daniel Aleman is a 89 y.o. female who presents today for evaluation of abnormal EKG    She is seen in consultation at the request of Dr. Daisy Espinoza for above issue.  She reports no known major medical problem.  She was recently seen by her primary care provider for a routine medical checkup.  An electrocardiogram was performed and there was concern about possible septal infarct.  The patient denies any cardiac symptoms.  She is somewhat sedentary but denies chest discomfort or shortness of breath.  She was seen by cardiologist in  for chest pain.  Electrocardiogram reportedly showed nonspecific inferior Q waves and relatively tall R waves in the anterior precordial leads lasting some concern about inferior wall infarct with posterior wall extension.  She subsequently underwent an exercise stress test which was negative.  She also underwent echocardiography at that time which was unremarkable.    Past Medical History:   Diagnosis Date   • High cholesterol    • Trigeminal neuralgia      Past Surgical History:   Procedure Laterality Date   • GYN SURGERY       x 3   • PRIMARY C SECTION           Family History   Problem Relation Age of Onset   • Heart Disease Neg Hx      Social History     Social History   • Marital status:      Spouse name: N/A   • Number of children: N/A   • Years of education: N/A     Occupational History   • Not on file.     Social History Main Topics   • Smoking status: Never Smoker   • Smokeless tobacco: Never Used   • Alcohol use No   • Drug use: No   • Sexual activity: Not on file     Other Topics Concern   • Not on file     Social History Narrative   • No narrative on file     Not on File  Outpatient Encounter Prescriptions as of 2018   Medication Sig Dispense Refill   • calcium polycarbophil (FIBERCON) 625 MG Tab Take 625 mg by mouth every day.     • Cholecalciferol (VITAMIN D3) 3000  units Tab Take 1 Cap by mouth every day.     • metformin (GLUCOPHAGE) 500 MG Tab Take 500 mg by mouth every day.     • Ilamjbwds-Pevtuwemp-Zxjhmtpkqp (CEREFOLIN NAC PO) Take 1 Tab by mouth 1 time daily as needed.       No facility-administered encounter medications on file as of 12/12/2018.      Review of Systems   Constitutional: Negative for malaise/fatigue and weight loss.   HENT: Positive for hearing loss. Negative for congestion, nosebleeds and tinnitus.    Eyes: Negative for double vision.   Respiratory: Negative for shortness of breath.    Cardiovascular: Negative for chest pain, palpitations, orthopnea and leg swelling.   Gastrointestinal: Negative for blood in stool, nausea and vomiting.   Genitourinary: Negative for frequency and hematuria.   Musculoskeletal: Negative for back pain and neck pain.   Skin: Negative for rash.   Neurological: Negative for sensory change, focal weakness, weakness and headaches.   Endo/Heme/Allergies: Does not bruise/bleed easily.   Psychiatric/Behavioral: Positive for memory loss. Negative for depression. The patient is not nervous/anxious.    All other systems reviewed and are negative.       Objective:   /60 (BP Location: Right arm)   Pulse 68   Resp 16   Ht 1.524 m (5')   Wt 47.6 kg (105 lb)   SpO2 93%   BMI 20.51 kg/m²     Physical Exam   Constitutional: She is oriented to person, place, and time. No distress.   HENT:   Head: Normocephalic and atraumatic.   Eyes: EOM are normal.   Neck: No JVD present. No thyromegaly present.   Cardiovascular: Normal rate and regular rhythm.  Exam reveals no gallop.    Murmur heard.   Systolic murmur is present with a grade of 2/6   RUSB   Pulmonary/Chest: Effort normal. No respiratory distress. She has no wheezes. She has no rales.   Abdominal: Soft. There is no tenderness.   Musculoskeletal: She exhibits no edema or deformity.   Neurological: She is alert and oriented to person, place, and time.   Skin: Skin is warm. No  erythema.   Psychiatric: Her behavior is normal.     I did review her electrocardiogram from November 19 the QRS complex in lead V2 was very small. Tthere was no definite Q wave but the entire QS complex was only 1.5-2 mm.  There was late transition and low voltage in the limb leads without other significant Q waves noted.    Assessment:     1. Nonspecific abnormal electrocardiogram (ECG) (EKG)  EC-ECHOCARDIOGRAM COMPLETE W/O CONT   2. Pre-diabetes     3. Heart murmur  EC-ECHOCARDIOGRAM COMPLETE W/O CONT       Medical Decision Making:  Today's Assessment / Status / Plan:     I believe the finding on her EKG is nonspecific, probably age related pulmonary emphysema rather than prior septal infarct. With heart murmur and her age, will obtain echocardiography to assess wall motion and aortic valve.  I did not start her on a new medication.  We will keep you posted about our finding and further recommendation as it becomes available.  Thank you for allowing us to participate in the care of this patient.

## 2019-01-08 ENCOUNTER — HOSPITAL ENCOUNTER (OUTPATIENT)
Dept: CARDIOLOGY | Facility: MEDICAL CENTER | Age: 84
End: 2019-01-08
Attending: INTERNAL MEDICINE
Payer: MEDICARE

## 2019-01-08 DIAGNOSIS — R94.31 NONSPECIFIC ABNORMAL ELECTROCARDIOGRAM (ECG) (EKG): ICD-10-CM

## 2019-01-08 DIAGNOSIS — R01.1 HEART MURMUR: ICD-10-CM

## 2019-01-08 PROCEDURE — 93306 TTE W/DOPPLER COMPLETE: CPT

## 2019-01-08 PROCEDURE — 93306 TTE W/DOPPLER COMPLETE: CPT | Mod: 26 | Performed by: INTERNAL MEDICINE

## 2019-01-09 LAB
LV EJECT FRACT  99904: 70
LV EJECT FRACT MOD 2C 99903: 72.09
LV EJECT FRACT MOD 4C 99902: 66.68
LV EJECT FRACT MOD BP 99901: 68.55

## 2019-01-10 ENCOUNTER — TELEPHONE (OUTPATIENT)
Dept: CARDIOLOGY | Facility: MEDICAL CENTER | Age: 84
End: 2019-01-10

## 2019-01-10 NOTE — LETTER
January 10, 2019        Daniel Aleman  [address] [place] [address]          Dear Daniel,    We have received the results of your recent: Echocardiogram.    Your test came back unchanged or within normal limits.  Please follow up as previously discussed with your physician.      Feel free to call us with any questions.        Sincerely,          Shayla Aguero  Electronically Signed

## 2019-01-15 ENCOUNTER — HOSPITAL ENCOUNTER (OUTPATIENT)
Dept: RADIOLOGY | Facility: MEDICAL CENTER | Age: 84
End: 2019-01-15
Attending: FAMILY MEDICINE
Payer: MEDICARE

## 2019-01-15 DIAGNOSIS — M25.552 LEFT HIP PAIN: ICD-10-CM

## 2019-01-15 PROCEDURE — 73521 X-RAY EXAM HIPS BI 2 VIEWS: CPT

## 2019-04-19 ENCOUNTER — HOSPITAL ENCOUNTER (OUTPATIENT)
Dept: RADIOLOGY | Facility: MEDICAL CENTER | Age: 84
End: 2019-04-19
Attending: FAMILY MEDICINE
Payer: MEDICARE

## 2019-04-19 DIAGNOSIS — S99.922A INJURY OF LEFT FOOT, INITIAL ENCOUNTER: ICD-10-CM

## 2019-04-19 PROCEDURE — 73630 X-RAY EXAM OF FOOT: CPT | Mod: LT

## 2019-05-21 ENCOUNTER — HOSPITAL ENCOUNTER (OUTPATIENT)
Dept: LAB | Facility: MEDICAL CENTER | Age: 84
End: 2019-05-21
Attending: FAMILY MEDICINE
Payer: MEDICARE

## 2019-05-21 LAB
ALBUMIN SERPL BCP-MCNC: 3.7 G/DL (ref 3.2–4.9)
ALBUMIN/GLOB SERPL: 1.2 G/DL
ALP SERPL-CCNC: 73 U/L (ref 30–99)
ALT SERPL-CCNC: 5 U/L (ref 2–50)
ANION GAP SERPL CALC-SCNC: 8 MMOL/L (ref 0–11.9)
AST SERPL-CCNC: 14 U/L (ref 12–45)
BASOPHILS # BLD AUTO: 0.8 % (ref 0–1.8)
BASOPHILS # BLD: 0.04 K/UL (ref 0–0.12)
BILIRUB SERPL-MCNC: 0.6 MG/DL (ref 0.1–1.5)
BUN SERPL-MCNC: 13 MG/DL (ref 8–22)
CALCIUM SERPL-MCNC: 9 MG/DL (ref 8.5–10.5)
CHLORIDE SERPL-SCNC: 105 MMOL/L (ref 96–112)
CO2 SERPL-SCNC: 30 MMOL/L (ref 20–33)
CREAT SERPL-MCNC: 0.52 MG/DL (ref 0.5–1.4)
EOSINOPHIL # BLD AUTO: 0.13 K/UL (ref 0–0.51)
EOSINOPHIL NFR BLD: 2.6 % (ref 0–6.9)
ERYTHROCYTE [DISTWIDTH] IN BLOOD BY AUTOMATED COUNT: 48.3 FL (ref 35.9–50)
EST. AVERAGE GLUCOSE BLD GHB EST-MCNC: 134 MG/DL
GLOBULIN SER CALC-MCNC: 3.1 G/DL (ref 1.9–3.5)
GLUCOSE SERPL-MCNC: 100 MG/DL (ref 65–99)
HBA1C MFR BLD: 6.3 % (ref 0–5.6)
HCT VFR BLD AUTO: 42.8 % (ref 37–47)
HGB BLD-MCNC: 13.4 G/DL (ref 12–16)
IMM GRANULOCYTES # BLD AUTO: 0.01 K/UL (ref 0–0.11)
IMM GRANULOCYTES NFR BLD AUTO: 0.2 % (ref 0–0.9)
LYMPHOCYTES # BLD AUTO: 1.39 K/UL (ref 1–4.8)
LYMPHOCYTES NFR BLD: 27.3 % (ref 22–41)
MCH RBC QN AUTO: 30.9 PG (ref 27–33)
MCHC RBC AUTO-ENTMCNC: 31.3 G/DL (ref 33.6–35)
MCV RBC AUTO: 98.6 FL (ref 81.4–97.8)
MONOCYTES # BLD AUTO: 0.42 K/UL (ref 0–0.85)
MONOCYTES NFR BLD AUTO: 8.3 % (ref 0–13.4)
NEUTROPHILS # BLD AUTO: 3.1 K/UL (ref 2–7.15)
NEUTROPHILS NFR BLD: 60.8 % (ref 44–72)
NRBC # BLD AUTO: 0 K/UL
NRBC BLD-RTO: 0 /100 WBC
PLATELET # BLD AUTO: 221 K/UL (ref 164–446)
PMV BLD AUTO: 10.9 FL (ref 9–12.9)
POTASSIUM SERPL-SCNC: 3.4 MMOL/L (ref 3.6–5.5)
PROT SERPL-MCNC: 6.8 G/DL (ref 6–8.2)
RBC # BLD AUTO: 4.34 M/UL (ref 4.2–5.4)
SODIUM SERPL-SCNC: 143 MMOL/L (ref 135–145)
WBC # BLD AUTO: 5.1 K/UL (ref 4.8–10.8)

## 2019-05-21 PROCEDURE — 83036 HEMOGLOBIN GLYCOSYLATED A1C: CPT

## 2019-05-21 PROCEDURE — 85025 COMPLETE CBC W/AUTO DIFF WBC: CPT

## 2019-05-21 PROCEDURE — 80053 COMPREHEN METABOLIC PANEL: CPT

## 2019-05-21 PROCEDURE — 36415 COLL VENOUS BLD VENIPUNCTURE: CPT

## 2019-11-14 ENCOUNTER — HOSPITAL ENCOUNTER (OUTPATIENT)
Dept: LAB | Facility: MEDICAL CENTER | Age: 84
End: 2019-11-14
Attending: FAMILY MEDICINE
Payer: MEDICARE

## 2019-11-14 LAB
ALBUMIN SERPL BCP-MCNC: 3.8 G/DL (ref 3.2–4.9)
ALBUMIN/GLOB SERPL: 1.5 G/DL
ALP SERPL-CCNC: 68 U/L (ref 30–99)
ALT SERPL-CCNC: 12 U/L (ref 2–50)
ANION GAP SERPL CALC-SCNC: 9 MMOL/L (ref 0–11.9)
AST SERPL-CCNC: 17 U/L (ref 12–45)
BASOPHILS # BLD AUTO: 0.7 % (ref 0–1.8)
BASOPHILS # BLD: 0.04 K/UL (ref 0–0.12)
BILIRUB SERPL-MCNC: 0.3 MG/DL (ref 0.1–1.5)
BUN SERPL-MCNC: 22 MG/DL (ref 8–22)
CALCIUM SERPL-MCNC: 9 MG/DL (ref 8.5–10.5)
CHLORIDE SERPL-SCNC: 108 MMOL/L (ref 96–112)
CO2 SERPL-SCNC: 27 MMOL/L (ref 20–33)
CREAT SERPL-MCNC: 0.63 MG/DL (ref 0.5–1.4)
EOSINOPHIL # BLD AUTO: 0.2 K/UL (ref 0–0.51)
EOSINOPHIL NFR BLD: 3.5 % (ref 0–6.9)
ERYTHROCYTE [DISTWIDTH] IN BLOOD BY AUTOMATED COUNT: 50.1 FL (ref 35.9–50)
GLOBULIN SER CALC-MCNC: 2.6 G/DL (ref 1.9–3.5)
GLUCOSE SERPL-MCNC: 115 MG/DL (ref 65–99)
HCT VFR BLD AUTO: 40.4 % (ref 37–47)
HGB BLD-MCNC: 12.7 G/DL (ref 12–16)
IMM GRANULOCYTES # BLD AUTO: 0.01 K/UL (ref 0–0.11)
IMM GRANULOCYTES NFR BLD AUTO: 0.2 % (ref 0–0.9)
LYMPHOCYTES # BLD AUTO: 1.53 K/UL (ref 1–4.8)
LYMPHOCYTES NFR BLD: 26.6 % (ref 22–41)
MCH RBC QN AUTO: 31.4 PG (ref 27–33)
MCHC RBC AUTO-ENTMCNC: 31.4 G/DL (ref 33.6–35)
MCV RBC AUTO: 100 FL (ref 81.4–97.8)
MONOCYTES # BLD AUTO: 0.51 K/UL (ref 0–0.85)
MONOCYTES NFR BLD AUTO: 8.9 % (ref 0–13.4)
NEUTROPHILS # BLD AUTO: 3.47 K/UL (ref 2–7.15)
NEUTROPHILS NFR BLD: 60.1 % (ref 44–72)
NRBC # BLD AUTO: 0 K/UL
NRBC BLD-RTO: 0 /100 WBC
PLATELET # BLD AUTO: 199 K/UL (ref 164–446)
PMV BLD AUTO: 11 FL (ref 9–12.9)
POTASSIUM SERPL-SCNC: 4 MMOL/L (ref 3.6–5.5)
PROT SERPL-MCNC: 6.4 G/DL (ref 6–8.2)
RBC # BLD AUTO: 4.04 M/UL (ref 4.2–5.4)
SODIUM SERPL-SCNC: 144 MMOL/L (ref 135–145)
TSH SERPL DL<=0.005 MIU/L-ACNC: 2.59 UIU/ML (ref 0.38–5.33)
WBC # BLD AUTO: 5.8 K/UL (ref 4.8–10.8)

## 2019-11-14 PROCEDURE — 36415 COLL VENOUS BLD VENIPUNCTURE: CPT

## 2019-11-14 PROCEDURE — 85025 COMPLETE CBC W/AUTO DIFF WBC: CPT

## 2019-11-14 PROCEDURE — 84443 ASSAY THYROID STIM HORMONE: CPT

## 2019-11-14 PROCEDURE — 80053 COMPREHEN METABOLIC PANEL: CPT

## 2020-05-29 ENCOUNTER — HOSPITAL ENCOUNTER (OUTPATIENT)
Dept: LAB | Facility: MEDICAL CENTER | Age: 85
End: 2020-05-29
Attending: FAMILY MEDICINE
Payer: MEDICARE

## 2020-05-29 LAB
ALBUMIN SERPL BCP-MCNC: 3.8 G/DL (ref 3.2–4.9)
ALBUMIN/GLOB SERPL: 1.3 G/DL
ALP SERPL-CCNC: 75 U/L (ref 30–99)
ALT SERPL-CCNC: 12 U/L (ref 2–50)
ANION GAP SERPL CALC-SCNC: 12 MMOL/L (ref 7–16)
AST SERPL-CCNC: 17 U/L (ref 12–45)
BASOPHILS # BLD AUTO: 0.5 % (ref 0–1.8)
BASOPHILS # BLD: 0.03 K/UL (ref 0–0.12)
BILIRUB SERPL-MCNC: 0.6 MG/DL (ref 0.1–1.5)
BUN SERPL-MCNC: 23 MG/DL (ref 8–22)
CALCIUM SERPL-MCNC: 9 MG/DL (ref 8.5–10.5)
CHLORIDE SERPL-SCNC: 105 MMOL/L (ref 96–112)
CHOLEST SERPL-MCNC: 185 MG/DL (ref 100–199)
CO2 SERPL-SCNC: 25 MMOL/L (ref 20–33)
CREAT SERPL-MCNC: 0.78 MG/DL (ref 0.5–1.4)
EOSINOPHIL # BLD AUTO: 0.21 K/UL (ref 0–0.51)
EOSINOPHIL NFR BLD: 3.2 % (ref 0–6.9)
ERYTHROCYTE [DISTWIDTH] IN BLOOD BY AUTOMATED COUNT: 47.5 FL (ref 35.9–50)
EST. AVERAGE GLUCOSE BLD GHB EST-MCNC: 151 MG/DL
FASTING STATUS PATIENT QL REPORTED: NORMAL
GLOBULIN SER CALC-MCNC: 2.9 G/DL (ref 1.9–3.5)
GLUCOSE SERPL-MCNC: 109 MG/DL (ref 65–99)
HBA1C MFR BLD: 6.9 % (ref 0–5.6)
HCT VFR BLD AUTO: 41.9 % (ref 37–47)
HDLC SERPL-MCNC: 48 MG/DL
HGB BLD-MCNC: 13.2 G/DL (ref 12–16)
IMM GRANULOCYTES # BLD AUTO: 0.02 K/UL (ref 0–0.11)
IMM GRANULOCYTES NFR BLD AUTO: 0.3 % (ref 0–0.9)
LDLC SERPL CALC-MCNC: 117 MG/DL
LYMPHOCYTES # BLD AUTO: 1.97 K/UL (ref 1–4.8)
LYMPHOCYTES NFR BLD: 29.9 % (ref 22–41)
MCH RBC QN AUTO: 31.5 PG (ref 27–33)
MCHC RBC AUTO-ENTMCNC: 31.5 G/DL (ref 33.6–35)
MCV RBC AUTO: 100 FL (ref 81.4–97.8)
MONOCYTES # BLD AUTO: 0.55 K/UL (ref 0–0.85)
MONOCYTES NFR BLD AUTO: 8.3 % (ref 0–13.4)
NEUTROPHILS # BLD AUTO: 3.81 K/UL (ref 2–7.15)
NEUTROPHILS NFR BLD: 57.8 % (ref 44–72)
NRBC # BLD AUTO: 0 K/UL
NRBC BLD-RTO: 0 /100 WBC
PLATELET # BLD AUTO: 215 K/UL (ref 164–446)
PMV BLD AUTO: 10.6 FL (ref 9–12.9)
POTASSIUM SERPL-SCNC: 4.1 MMOL/L (ref 3.6–5.5)
PROT SERPL-MCNC: 6.7 G/DL (ref 6–8.2)
RBC # BLD AUTO: 4.19 M/UL (ref 4.2–5.4)
SODIUM SERPL-SCNC: 142 MMOL/L (ref 135–145)
TRIGL SERPL-MCNC: 100 MG/DL (ref 0–149)
TSH SERPL DL<=0.005 MIU/L-ACNC: 2.34 UIU/ML (ref 0.38–5.33)
WBC # BLD AUTO: 6.6 K/UL (ref 4.8–10.8)

## 2020-05-29 PROCEDURE — 36415 COLL VENOUS BLD VENIPUNCTURE: CPT

## 2020-05-29 PROCEDURE — 84443 ASSAY THYROID STIM HORMONE: CPT

## 2020-05-29 PROCEDURE — 80053 COMPREHEN METABOLIC PANEL: CPT

## 2020-05-29 PROCEDURE — 80061 LIPID PANEL: CPT

## 2020-05-29 PROCEDURE — 85025 COMPLETE CBC W/AUTO DIFF WBC: CPT

## 2020-05-29 PROCEDURE — 83036 HEMOGLOBIN GLYCOSYLATED A1C: CPT

## 2020-08-03 ENCOUNTER — APPOINTMENT (OUTPATIENT)
Dept: RADIOLOGY | Facility: MEDICAL CENTER | Age: 85
DRG: 536 | End: 2020-08-03
Attending: EMERGENCY MEDICINE
Payer: MEDICARE

## 2020-08-03 ENCOUNTER — HOSPITAL ENCOUNTER (INPATIENT)
Facility: MEDICAL CENTER | Age: 85
LOS: 2 days | DRG: 536 | End: 2020-08-05
Attending: EMERGENCY MEDICINE | Admitting: INTERNAL MEDICINE
Payer: MEDICARE

## 2020-08-03 DIAGNOSIS — S32.591A CLOSED FRACTURE OF RIGHT INFERIOR PUBIC RAMUS, INITIAL ENCOUNTER (HCC): ICD-10-CM
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
## 2020-08-03 DIAGNOSIS — S32.9XXA CLOSED NONDISPLACED FRACTURE OF PELVIS, UNSPECIFIED PART OF PELVIS, INITIAL ENCOUNTER (HCC): ICD-10-CM

## 2020-08-03 PROBLEM — E87.6 HYPOKALEMIA: Status: ACTIVE | Noted: 2020-08-03

## 2020-08-03 PROBLEM — S32.599A INFERIOR PUBIC RAMUS FRACTURE (HCC): Status: ACTIVE | Noted: 2020-08-03

## 2020-08-03 PROBLEM — F03.90 DEMENTIA (HCC): Status: ACTIVE | Noted: 2020-08-03

## 2020-08-03 LAB
25(OH)D3 SERPL-MCNC: 31 NG/ML (ref 30–100)
ALBUMIN SERPL BCP-MCNC: 3.5 G/DL (ref 3.2–4.9)
ALBUMIN/GLOB SERPL: 1.1 G/DL
ALP SERPL-CCNC: 73 U/L (ref 30–99)
ALT SERPL-CCNC: 6 U/L (ref 2–50)
ANION GAP SERPL CALC-SCNC: 9 MMOL/L (ref 7–16)
AST SERPL-CCNC: 14 U/L (ref 12–45)
BASOPHILS # BLD AUTO: 0.4 % (ref 0–1.8)
BASOPHILS # BLD: 0.03 K/UL (ref 0–0.12)
BILIRUB SERPL-MCNC: 0.8 MG/DL (ref 0.1–1.5)
BUN SERPL-MCNC: 23 MG/DL (ref 8–22)
CALCIUM SERPL-MCNC: 9.1 MG/DL (ref 8.4–10.2)
CHLORIDE SERPL-SCNC: 101 MMOL/L (ref 96–112)
CO2 SERPL-SCNC: 29 MMOL/L (ref 20–33)
CREAT SERPL-MCNC: 0.75 MG/DL (ref 0.5–1.4)
EOSINOPHIL # BLD AUTO: 0.12 K/UL (ref 0–0.51)
EOSINOPHIL NFR BLD: 1.5 % (ref 0–6.9)
ERYTHROCYTE [DISTWIDTH] IN BLOOD BY AUTOMATED COUNT: 46.4 FL (ref 35.9–50)
GLOBULIN SER CALC-MCNC: 3.3 G/DL (ref 1.9–3.5)
GLUCOSE SERPL-MCNC: 154 MG/DL (ref 65–99)
HCT VFR BLD AUTO: 41.2 % (ref 37–47)
HGB BLD-MCNC: 13.4 G/DL (ref 12–16)
IMM GRANULOCYTES # BLD AUTO: 0.03 K/UL (ref 0–0.11)
IMM GRANULOCYTES NFR BLD AUTO: 0.4 % (ref 0–0.9)
LYMPHOCYTES # BLD AUTO: 1.35 K/UL (ref 1–4.8)
LYMPHOCYTES NFR BLD: 16.7 % (ref 22–41)
MAGNESIUM SERPL-MCNC: 2 MG/DL (ref 1.5–2.5)
MCH RBC QN AUTO: 31.4 PG (ref 27–33)
MCHC RBC AUTO-ENTMCNC: 32.5 G/DL (ref 33.6–35)
MCV RBC AUTO: 96.5 FL (ref 81.4–97.8)
MONOCYTES # BLD AUTO: 0.62 K/UL (ref 0–0.85)
MONOCYTES NFR BLD AUTO: 7.7 % (ref 0–13.4)
NEUTROPHILS # BLD AUTO: 5.91 K/UL (ref 2–7.15)
NEUTROPHILS NFR BLD: 73.3 % (ref 44–72)
NRBC # BLD AUTO: 0 K/UL
NRBC BLD-RTO: 0 /100 WBC
PLATELET # BLD AUTO: 172 K/UL (ref 164–446)
PMV BLD AUTO: 9.9 FL (ref 9–12.9)
POTASSIUM SERPL-SCNC: 3.5 MMOL/L (ref 3.6–5.5)
PROT SERPL-MCNC: 6.8 G/DL (ref 6–8.2)
RBC # BLD AUTO: 4.27 M/UL (ref 4.2–5.4)
SODIUM SERPL-SCNC: 139 MMOL/L (ref 135–145)
WBC # BLD AUTO: 8.1 K/UL (ref 4.8–10.8)

## 2020-08-03 PROCEDURE — A9270 NON-COVERED ITEM OR SERVICE: HCPCS | Performed by: INTERNAL MEDICINE

## 2020-08-03 PROCEDURE — 73700 CT LOWER EXTREMITY W/O DYE: CPT | Mod: RT

## 2020-08-03 PROCEDURE — 700111 HCHG RX REV CODE 636 W/ 250 OVERRIDE (IP): Performed by: INTERNAL MEDICINE

## 2020-08-03 PROCEDURE — 73502 X-RAY EXAM HIP UNI 2-3 VIEWS: CPT | Mod: RT

## 2020-08-03 PROCEDURE — 700102 HCHG RX REV CODE 250 W/ 637 OVERRIDE(OP): Performed by: INTERNAL MEDICINE

## 2020-08-03 PROCEDURE — 99285 EMERGENCY DEPT VISIT HI MDM: CPT

## 2020-08-03 PROCEDURE — 82306 VITAMIN D 25 HYDROXY: CPT

## 2020-08-03 PROCEDURE — 99221 1ST HOSP IP/OBS SF/LOW 40: CPT | Mod: AI | Performed by: INTERNAL MEDICINE

## 2020-08-03 PROCEDURE — 94760 N-INVAS EAR/PLS OXIMETRY 1: CPT

## 2020-08-03 PROCEDURE — 85025 COMPLETE CBC W/AUTO DIFF WBC: CPT

## 2020-08-03 PROCEDURE — 83735 ASSAY OF MAGNESIUM: CPT

## 2020-08-03 PROCEDURE — 80053 COMPREHEN METABOLIC PANEL: CPT

## 2020-08-03 PROCEDURE — 770006 HCHG ROOM/CARE - MED/SURG/GYN SEMI*

## 2020-08-03 RX ORDER — MORPHINE SULFATE 4 MG/ML
2 INJECTION, SOLUTION INTRAMUSCULAR; INTRAVENOUS
Status: DISCONTINUED | OUTPATIENT
Start: 2020-08-03 | End: 2020-08-05 | Stop reason: HOSPADM

## 2020-08-03 RX ORDER — OXYCODONE HYDROCHLORIDE 5 MG/1
2.5 TABLET ORAL
Status: DISCONTINUED | OUTPATIENT
Start: 2020-08-03 | End: 2020-08-05 | Stop reason: HOSPADM

## 2020-08-03 RX ORDER — OXYCODONE HYDROCHLORIDE 5 MG/1
5 TABLET ORAL
Status: DISCONTINUED | OUTPATIENT
Start: 2020-08-03 | End: 2020-08-05 | Stop reason: HOSPADM

## 2020-08-03 RX ORDER — ENALAPRILAT 1.25 MG/ML
1.25 INJECTION INTRAVENOUS EVERY 6 HOURS PRN
Status: DISCONTINUED | OUTPATIENT
Start: 2020-08-03 | End: 2020-08-05 | Stop reason: HOSPADM

## 2020-08-03 RX ORDER — POTASSIUM CHLORIDE 20 MEQ/1
20 TABLET, EXTENDED RELEASE ORAL ONCE
Status: COMPLETED | OUTPATIENT
Start: 2020-08-03 | End: 2020-08-03

## 2020-08-03 RX ORDER — ONDANSETRON 2 MG/ML
4 INJECTION INTRAMUSCULAR; INTRAVENOUS EVERY 4 HOURS PRN
Status: DISCONTINUED | OUTPATIENT
Start: 2020-08-03 | End: 2020-08-05 | Stop reason: HOSPADM

## 2020-08-03 RX ORDER — ONDANSETRON 4 MG/1
4 TABLET, ORALLY DISINTEGRATING ORAL EVERY 4 HOURS PRN
Status: DISCONTINUED | OUTPATIENT
Start: 2020-08-03 | End: 2020-08-05 | Stop reason: HOSPADM

## 2020-08-03 RX ORDER — LABETALOL HYDROCHLORIDE 5 MG/ML
10 INJECTION, SOLUTION INTRAVENOUS EVERY 4 HOURS PRN
Status: DISCONTINUED | OUTPATIENT
Start: 2020-08-03 | End: 2020-08-05 | Stop reason: HOSPADM

## 2020-08-03 RX ORDER — AMOXICILLIN 250 MG
2 CAPSULE ORAL 2 TIMES DAILY
Status: DISCONTINUED | OUTPATIENT
Start: 2020-08-03 | End: 2020-08-05 | Stop reason: HOSPADM

## 2020-08-03 RX ORDER — ACETAMINOPHEN 325 MG/1
650 TABLET ORAL EVERY 6 HOURS PRN
Status: DISCONTINUED | OUTPATIENT
Start: 2020-08-03 | End: 2020-08-05 | Stop reason: HOSPADM

## 2020-08-03 RX ORDER — POLYETHYLENE GLYCOL 3350 17 G/17G
1 POWDER, FOR SOLUTION ORAL
Status: DISCONTINUED | OUTPATIENT
Start: 2020-08-03 | End: 2020-08-05 | Stop reason: HOSPADM

## 2020-08-03 RX ORDER — BISACODYL 10 MG
10 SUPPOSITORY, RECTAL RECTAL
Status: DISCONTINUED | OUTPATIENT
Start: 2020-08-03 | End: 2020-08-05 | Stop reason: HOSPADM

## 2020-08-03 RX ORDER — FOLIC ACID 1 MG/1
1 TABLET ORAL DAILY
COMMUNITY
End: 2021-06-21

## 2020-08-03 RX ADMIN — SENNOSIDES-DOCUSATE SODIUM TAB 8.6-50 MG 2 TABLET: 8.6-5 TAB at 18:34

## 2020-08-03 RX ADMIN — POTASSIUM CHLORIDE 20 MEQ: 1500 TABLET, EXTENDED RELEASE ORAL at 18:33

## 2020-08-03 RX ADMIN — CALCIUM CARBONATE-CHOLECALCIFEROL TAB 250 MG-125 UNIT 1 TABLET: 250-125 TAB at 18:35

## 2020-08-03 RX ADMIN — ENOXAPARIN SODIUM 30 MG: 30 INJECTION, SOLUTION INTRAVENOUS; SUBCUTANEOUS at 18:35

## 2020-08-03 ASSESSMENT — COGNITIVE AND FUNCTIONAL STATUS - GENERAL
CLIMB 3 TO 5 STEPS WITH RAILING: A LOT
MOBILITY SCORE: 12
STANDING UP FROM CHAIR USING ARMS: A LOT
DRESSING REGULAR LOWER BODY CLOTHING: A LOT
DRESSING REGULAR UPPER BODY CLOTHING: A LOT
HELP NEEDED FOR BATHING: A LOT
MOVING FROM LYING ON BACK TO SITTING ON SIDE OF FLAT BED: A LOT
SUGGESTED CMS G CODE MODIFIER DAILY ACTIVITY: CK
DAILY ACTIVITIY SCORE: 17
TURNING FROM BACK TO SIDE WHILE IN FLAT BAD: A LOT
TOILETING: A LITTLE
WALKING IN HOSPITAL ROOM: A LOT
SUGGESTED CMS G CODE MODIFIER MOBILITY: CL
MOVING TO AND FROM BED TO CHAIR: A LOT

## 2020-08-03 ASSESSMENT — LIFESTYLE VARIABLES
HOW MANY TIMES IN THE PAST YEAR HAVE YOU HAD 5 OR MORE DRINKS IN A DAY: 0
TOTAL SCORE: 0
EVER_SMOKED: NEVER
TOTAL SCORE: 0
ALCOHOL_USE: NO
EVER_SMOKED: NEVER
HAVE YOU EVER FELT YOU SHOULD CUT DOWN ON YOUR DRINKING: NO
HAVE PEOPLE ANNOYED YOU BY CRITICIZING YOUR DRINKING: NO
CONSUMPTION TOTAL: NEGATIVE
TOTAL SCORE: 0
ON A TYPICAL DAY WHEN YOU DRINK ALCOHOL HOW MANY DRINKS DO YOU HAVE: 0
EVER HAD A DRINK FIRST THING IN THE MORNING TO STEADY YOUR NERVES TO GET RID OF A HANGOVER: NO
EVER FELT BAD OR GUILTY ABOUT YOUR DRINKING: NO
AVERAGE NUMBER OF DAYS PER WEEK YOU HAVE A DRINK CONTAINING ALCOHOL: 0

## 2020-08-03 ASSESSMENT — PATIENT HEALTH QUESTIONNAIRE - PHQ9
2. FEELING DOWN, DEPRESSED, IRRITABLE, OR HOPELESS: NOT AT ALL
1. LITTLE INTEREST OR PLEASURE IN DOING THINGS: NOT AT ALL
SUM OF ALL RESPONSES TO PHQ9 QUESTIONS 1 AND 2: 0

## 2020-08-03 ASSESSMENT — FIBROSIS 4 INDEX
FIB4 SCORE: 3.09
FIB4 SCORE: 2.12
FIB4 SCORE: 2.12

## 2020-08-03 NOTE — ASSESSMENT & PLAN NOTE
-Due to mechanical fall.  Unclear of the mechanism of the fall, but she was found by her  on the floor.  She has dementia.  Nonoperative per orthopedics.  - start calcium + vit D. Check Vit D level. Will need outpatient bone scan to evaluate for osteoporosis and need for bisphosphonates.   - continue pain control with oral oxycodone, and IV morphine.  - Continue pharmacologic DVT prophylaxis while in the hospital.  Start subcutaneous Lovenox.  - PT/OT   Pending SNF acceptance

## 2020-08-03 NOTE — ED NOTES
Med Rec complete per Pt and Pt's  at bedside  Ok per Pt to discuss medications with visitor/s present    Allergies Reviewed  No ABX in the last 14 days    Pt does not take any medications

## 2020-08-03 NOTE — ED PROVIDER NOTES
ED Provider Note    CHIEF COMPLAINT  Chief Complaint   Patient presents with   • GLF   • Hip Injury       HPI  Daniel Aleman is a 89 y.o. female who presents for evaluation after ground-level fall.  The patient is here with her  of 63 years.  She evidently has a history of dementia and really no history is obtained from her.  She had a fall 3 days ago and has not been able to ambulate since then.  She evidently is complained of right hip pain.  She is not on blood thinners.    REVIEW OF SYSTEMS  See HPI for further details. All other systems negative.    PAST MEDICAL HISTORY  Past Medical History:   Diagnosis Date   • High cholesterol    • Trigeminal neuralgia        FAMILY HISTORY  Family History   Problem Relation Age of Onset   • Heart Disease Neg Hx        SOCIAL HISTORY  Social History     Socioeconomic History   • Marital status:      Spouse name: Not on file   • Number of children: Not on file   • Years of education: Not on file   • Highest education level: Not on file   Occupational History   • Not on file   Social Needs   • Financial resource strain: Not on file   • Food insecurity     Worry: Not on file     Inability: Not on file   • Transportation needs     Medical: Not on file     Non-medical: Not on file   Tobacco Use   • Smoking status: Never Smoker   • Smokeless tobacco: Never Used   Substance and Sexual Activity   • Alcohol use: No   • Drug use: No   • Sexual activity: Not on file   Lifestyle   • Physical activity     Days per week: Not on file     Minutes per session: Not on file   • Stress: Not on file   Relationships   • Social connections     Talks on phone: Not on file     Gets together: Not on file     Attends Anabaptism service: Not on file     Active member of club or organization: Not on file     Attends meetings of clubs or organizations: Not on file     Relationship status: Not on file   • Intimate partner violence     Fear of current or ex partner: Not on file     Emotionally  abused: Not on file     Physically abused: Not on file     Forced sexual activity: Not on file   Other Topics Concern   • Not on file   Social History Narrative   • Not on file       SURGICAL HISTORY  Past Surgical History:   Procedure Laterality Date   • GYN SURGERY       x 3   • PRIMARY C SECTION             CURRENT MEDICATIONS  Home Medications    **Home medications have not yet been reviewed for this encounter**         ALLERGIES  No Known Allergies    PHYSICAL EXAM  VITAL SIGNS: /64   Pulse 70   Temp 36.6 °C (97.8 °F) (Temporal)   Resp 16   Ht 1.524 m (5')   Wt 44.8 kg (98 lb 12.3 oz)   SpO2 93%   BMI 19.29 kg/m²   Constitutional: Thin elderly female in no acute distress.  HENT: Normocephalic, Atraumatic.  Eyes:  EOMI, Conjunctiva normal, No discharge.   Cardiovascular: Normal heart rate.   Thorax & Lungs: No respiratory distress. No chest tenderness.   Abdomen: Soft and nontender.   Skin: Warm, Dry.  Musculoskeletal: Lower extremity shows no obvious deformity.  Pain with any range of motion of the hip.  Neurologic: Awake and alert.  No focal deficits noted.           RADIOLOGY/PROCEDURES  CT-HIP W/O PLUS RECONS RIGHT   Final Result         1. Nondisplaced right inferior pubic ramus fracture. It may be acute to subacute.   2. No hip fracture.   3. Osteopenia.   4. Incidental 4.2 cm right ovarian dermoid cyst.   5. Colonic diverticulosis.      DX-HIP-COMPLETE - UNILATERAL 2+ RIGHT   Final Result      1.  No evidence of right hip fracture.      2.  Mild degenerative change of the hips.            COURSE & MEDICAL DECISION MAKING  Pertinent Labs & Imaging studies reviewed. (See chart for details)  This is a 89-year-old here with her  for evaluation after a ground-level fall.  She actually fell a couple of days ago and has not been able to ambulate independently since.  On exam she has no obvious deformity but discomfort with any range of motion of her right hip.  Plain films  show no acute bony abnormalities.  Due to my suspicion of fracture a CT scan is obtained which demonstrates a nondisplaced right inferior pubic ramus fracture.  I discussed the results of the studies with the patient and her .  They understand the plan for admission.  I will discussed the case with orthopedics as well as the hospitalist who will be the primary admitting physician.    FINAL IMPRESSION  1.  Right inferior pelvic ramus fracture  2.  Mechanical ground-level fall  3.  History of dementia         Electronically signed by: Todd Sánchez M.D., 8/3/2020 10:35 AM

## 2020-08-03 NOTE — H&P
Hospital Medicine History & Physical Note    Date of Service  8/3/2020    Primary Care Physician  Daisy Espinoza M.D.    Code Status  Full Code    Chief Complaint  Chief Complaint   Patient presents with   • GLF   • Hip Injury       History of Presenting Illness  89 y.o. female with dementia, who presented 8/3/2020 with pain on the right groin after she fell last Friday.  Patient does not remember how she fell, but her  found her on the floor.  Since then, she has complained of pain on the right groin and right hip, especially with standing up, and when her  attempts to move her.  However she remained pain-free when she is just still lying down or sitting down.  She has no other complaints.  No chest pain, shortness of breath, nausea vomiting, abdominal pain.  She was then brought to the ED.    ED course:  The patient was initially evaluated.  Vital signs were stable.  Initial blood work-up were only remarkable for potassium 3.5.  Hip CT showed nondisplaced right inferior pubic ramus fracture with no hip fracture.  Orthopedics was consulted, who recommended conservative management.  She was subsequently admitted to the hospitalist service.    Review of Systems  ROS     Pertinent positives/negatives as mentioned above.     A complete review of systems was personally done by me. All other systems were negative.       Past Medical History   has a past medical history of High cholesterol and Trigeminal neuralgia.    Surgical History   has a past surgical history that includes gyn surgery and primary c section.     Family History  family history is not on file.     Social History   reports that she has never smoked. She has never used smokeless tobacco. She reports that she does not drink alcohol or use drugs.    Allergies  No Known Allergies    Medications  None       Physical Exam  Temp:  [36.6 °C (97.8 °F)] 36.6 °C (97.8 °F)  Pulse:  [70] 70  Resp:  [16] 16  BP: (133)/(64) 133/64  SpO2:  [93 %] 93  %    Physical Exam  Vitals signs reviewed.   Constitutional:       General: She is not in acute distress.     Appearance: Normal appearance. She is normal weight. She is not ill-appearing or diaphoretic.   HENT:      Head: Normocephalic and atraumatic.      Right Ear: External ear normal.      Left Ear: External ear normal.      Mouth/Throat:      Mouth: Mucous membranes are moist.      Pharynx: No oropharyngeal exudate or posterior oropharyngeal erythema.   Eyes:      General: No scleral icterus.     Extraocular Movements: Extraocular movements intact.      Conjunctiva/sclera: Conjunctivae normal.      Pupils: Pupils are equal, round, and reactive to light.   Neck:      Musculoskeletal: Normal range of motion and neck supple. No neck rigidity or muscular tenderness.   Cardiovascular:      Rate and Rhythm: Normal rate and regular rhythm.      Heart sounds: Normal heart sounds. No murmur.   Pulmonary:      Effort: Pulmonary effort is normal. No respiratory distress.      Breath sounds: Normal breath sounds. No stridor. No wheezing, rhonchi or rales.   Chest:      Chest wall: No tenderness.   Abdominal:      General: Bowel sounds are normal. There is no distension.      Palpations: Abdomen is soft. There is no mass.      Tenderness: There is no abdominal tenderness. There is no guarding or rebound.   Musculoskeletal: Normal range of motion.         General: No swelling.      Right lower leg: No edema.      Left lower leg: No edema.      Comments: No tenderness on pelvic rock   Lymphadenopathy:      Cervical: No cervical adenopathy.   Skin:     General: Skin is warm and dry.      Coloration: Skin is not jaundiced.      Findings: No rash.   Neurological:      General: No focal deficit present.      Mental Status: She is alert and oriented to person, place, and time. Mental status is at baseline.      Cranial Nerves: No cranial nerve deficit.   Psychiatric:         Mood and Affect: Mood normal.         Behavior: Behavior  normal.         Thought Content: Thought content normal.         Judgment: Judgment normal.         Laboratory:  Recent Labs     08/03/20  1040   WBC 8.1   RBC 4.27   HEMOGLOBIN 13.4   HEMATOCRIT 41.2   MCV 96.5   MCH 31.4   MCHC 32.5*   RDW 46.4   PLATELETCT 172   MPV 9.9     Recent Labs     08/03/20  1040   SODIUM 139   POTASSIUM 3.5*   CHLORIDE 101   CO2 29   GLUCOSE 154*   BUN 23*   CREATININE 0.75   CALCIUM 9.1     Recent Labs     08/03/20  1040   ALTSGPT 6   ASTSGOT 14   ALKPHOSPHAT 73   TBILIRUBIN 0.8   GLUCOSE 154*         No results for input(s): NTPROBNP in the last 72 hours.      No results for input(s): TROPONINT in the last 72 hours.    Imaging:  CT-HIP W/O PLUS RECONS RIGHT   Final Result         1. Nondisplaced right inferior pubic ramus fracture. It may be acute to subacute.   2. No hip fracture.   3. Osteopenia.   4. Incidental 4.2 cm right ovarian dermoid cyst.   5. Colonic diverticulosis.      DX-HIP-COMPLETE - UNILATERAL 2+ RIGHT   Final Result      1.  No evidence of right hip fracture.      2.  Mild degenerative change of the hips.            Assessment/Plan:  I anticipate this patient will require at least two midnights for appropriate medical management, necessitating inpatient admission.    * Right inferior pubic ramus fracture (HCC)- (present on admission)  Assessment & Plan  -Due to mechanical fall.  Unclear of the mechanism of the fall, but she was found by her  on the floor.  She has dementia.  Nonoperative per orthopedics.  - start calcium + vit D. Check Vit D level. Will need outpatient bone scan to evaluate for osteoporosis and need for bisphosphonates.   - continue pain control with oral oxycodone, and IV morphine.  - Continue pharmacologic DVT prophylaxis while in the hospital.  Start subcutaneous Lovenox.  - PT/OT eval.      Hypokalemia- (present on admission)  Assessment & Plan  -Replaced with 20 mEq of oral K-Dur.  Check magnesium level and replace if low.  BMP in the  morning.    Dementia (HCC)- (present on admission)  Assessment & Plan  -High risk for delirium especially while in the hospital. Frequent re-orientation, reestablish circadian rhythm, encourage familiar faces/family in room, avoid or minimize narcotics/sedatives.         DVT prophylaxis: Lovenox SQ

## 2020-08-03 NOTE — ED TRIAGE NOTES
Pt BIB  from home  Chief Complaint   Patient presents with   • GLF   • Hip Injury   GLF on Friday, unwitnessed, pt found on ground by   Pt non weight bearing since fall  Pt c/o R hip and thigh pain    COVID-19 screening criteria completed, pt denies high risk travel and denies contact with COVID-19 positive pt

## 2020-08-03 NOTE — ASSESSMENT & PLAN NOTE
-High risk for delirium especially while in the hospital.   Avoid benzodiazepines/anticholinergic medications.  Minimize hypnotics/sedatives/narcotics  Minimize lines  Daily orientation

## 2020-08-04 LAB
ANION GAP SERPL CALC-SCNC: 12 MMOL/L (ref 7–16)
BASOPHILS # BLD AUTO: 0.5 % (ref 0–1.8)
BASOPHILS # BLD: 0.03 K/UL (ref 0–0.12)
BUN SERPL-MCNC: 20 MG/DL (ref 8–22)
CALCIUM SERPL-MCNC: 9 MG/DL (ref 8.4–10.2)
CHLORIDE SERPL-SCNC: 102 MMOL/L (ref 96–112)
CO2 SERPL-SCNC: 24 MMOL/L (ref 20–33)
COVID ORDER STATUS COVID19: NORMAL
CREAT SERPL-MCNC: 0.51 MG/DL (ref 0.5–1.4)
EOSINOPHIL # BLD AUTO: 0.22 K/UL (ref 0–0.51)
EOSINOPHIL NFR BLD: 3.3 % (ref 0–6.9)
ERYTHROCYTE [DISTWIDTH] IN BLOOD BY AUTOMATED COUNT: 46.5 FL (ref 35.9–50)
GLUCOSE SERPL-MCNC: 115 MG/DL (ref 65–99)
HCT VFR BLD AUTO: 41.3 % (ref 37–47)
HGB BLD-MCNC: 13.1 G/DL (ref 12–16)
IMM GRANULOCYTES # BLD AUTO: 0.01 K/UL (ref 0–0.11)
IMM GRANULOCYTES NFR BLD AUTO: 0.2 % (ref 0–0.9)
LYMPHOCYTES # BLD AUTO: 1.75 K/UL (ref 1–4.8)
LYMPHOCYTES NFR BLD: 26.4 % (ref 22–41)
MCH RBC QN AUTO: 31.4 PG (ref 27–33)
MCHC RBC AUTO-ENTMCNC: 31.7 G/DL (ref 33.6–35)
MCV RBC AUTO: 99 FL (ref 81.4–97.8)
MONOCYTES # BLD AUTO: 0.63 K/UL (ref 0–0.85)
MONOCYTES NFR BLD AUTO: 9.5 % (ref 0–13.4)
NEUTROPHILS # BLD AUTO: 3.98 K/UL (ref 2–7.15)
NEUTROPHILS NFR BLD: 60.1 % (ref 44–72)
NRBC # BLD AUTO: 0 K/UL
NRBC BLD-RTO: 0 /100 WBC
PLATELET # BLD AUTO: 150 K/UL (ref 164–446)
PMV BLD AUTO: 10.5 FL (ref 9–12.9)
POTASSIUM SERPL-SCNC: 4.3 MMOL/L (ref 3.6–5.5)
RBC # BLD AUTO: 4.17 M/UL (ref 4.2–5.4)
SODIUM SERPL-SCNC: 138 MMOL/L (ref 135–145)
WBC # BLD AUTO: 6.6 K/UL (ref 4.8–10.8)

## 2020-08-04 PROCEDURE — 85025 COMPLETE CBC W/AUTO DIFF WBC: CPT

## 2020-08-04 PROCEDURE — 97165 OT EVAL LOW COMPLEX 30 MIN: CPT

## 2020-08-04 PROCEDURE — 97535 SELF CARE MNGMENT TRAINING: CPT

## 2020-08-04 PROCEDURE — 700102 HCHG RX REV CODE 250 W/ 637 OVERRIDE(OP): Performed by: INTERNAL MEDICINE

## 2020-08-04 PROCEDURE — 770006 HCHG ROOM/CARE - MED/SURG/GYN SEMI*

## 2020-08-04 PROCEDURE — 700111 HCHG RX REV CODE 636 W/ 250 OVERRIDE (IP): Performed by: INTERNAL MEDICINE

## 2020-08-04 PROCEDURE — 99233 SBSQ HOSP IP/OBS HIGH 50: CPT | Performed by: FAMILY MEDICINE

## 2020-08-04 PROCEDURE — 80048 BASIC METABOLIC PNL TOTAL CA: CPT

## 2020-08-04 PROCEDURE — 97162 PT EVAL MOD COMPLEX 30 MIN: CPT

## 2020-08-04 PROCEDURE — A9270 NON-COVERED ITEM OR SERVICE: HCPCS | Performed by: INTERNAL MEDICINE

## 2020-08-04 PROCEDURE — 36415 COLL VENOUS BLD VENIPUNCTURE: CPT

## 2020-08-04 PROCEDURE — U0003 INFECTIOUS AGENT DETECTION BY NUCLEIC ACID (DNA OR RNA); SEVERE ACUTE RESPIRATORY SYNDROME CORONAVIRUS 2 (SARS-COV-2) (CORONAVIRUS DISEASE [COVID-19]), AMPLIFIED PROBE TECHNIQUE, MAKING USE OF HIGH THROUGHPUT TECHNOLOGIES AS DESCRIBED BY CMS-2020-01-R: HCPCS

## 2020-08-04 PROCEDURE — C9803 HOPD COVID-19 SPEC COLLECT: HCPCS | Performed by: FAMILY MEDICINE

## 2020-08-04 RX ADMIN — SENNOSIDES-DOCUSATE SODIUM TAB 8.6-50 MG 2 TABLET: 8.6-5 TAB at 16:34

## 2020-08-04 RX ADMIN — CALCIUM CARBONATE-CHOLECALCIFEROL TAB 250 MG-125 UNIT 1 TABLET: 250-125 TAB at 06:33

## 2020-08-04 RX ADMIN — SENNOSIDES-DOCUSATE SODIUM TAB 8.6-50 MG 2 TABLET: 8.6-5 TAB at 06:33

## 2020-08-04 RX ADMIN — ENOXAPARIN SODIUM 30 MG: 30 INJECTION, SOLUTION INTRAVENOUS; SUBCUTANEOUS at 16:33

## 2020-08-04 ASSESSMENT — ENCOUNTER SYMPTOMS
COUGH: 0
FALLS: 1
HEARTBURN: 0
NECK PAIN: 0
FEVER: 0
NAUSEA: 0
MYALGIAS: 0
BLURRED VISION: 0
HEMOPTYSIS: 0
SPUTUM PRODUCTION: 0
BACK PAIN: 0
DOUBLE VISION: 0
TINGLING: 0
HEADACHES: 0
PHOTOPHOBIA: 0
DEPRESSION: 0
PALPITATIONS: 0
DIZZINESS: 0
CHILLS: 0
VOMITING: 0

## 2020-08-04 ASSESSMENT — ACTIVITIES OF DAILY LIVING (ADL): TOILETING: INDEPENDENT

## 2020-08-04 ASSESSMENT — COGNITIVE AND FUNCTIONAL STATUS - GENERAL
DRESSING REGULAR UPPER BODY CLOTHING: A LITTLE
HELP NEEDED FOR BATHING: A LOT
MOVING TO AND FROM BED TO CHAIR: A LITTLE
DRESSING REGULAR LOWER BODY CLOTHING: A LOT
TURNING FROM BACK TO SIDE WHILE IN FLAT BAD: A LITTLE
MOBILITY SCORE: 17
STANDING UP FROM CHAIR USING ARMS: A LITTLE
MOVING FROM LYING ON BACK TO SITTING ON SIDE OF FLAT BED: A LITTLE
WALKING IN HOSPITAL ROOM: A LITTLE
SUGGESTED CMS G CODE MODIFIER MOBILITY: CK
SUGGESTED CMS G CODE MODIFIER DAILY ACTIVITY: CK
CLIMB 3 TO 5 STEPS WITH RAILING: A LOT
TOILETING: A LITTLE
DAILY ACTIVITIY SCORE: 18

## 2020-08-04 ASSESSMENT — GAIT ASSESSMENTS
ASSISTIVE DEVICE: FRONT WHEEL WALKER
DISTANCE (FEET): 20
GAIT LEVEL OF ASSIST: MINIMAL ASSIST
DEVIATION: ANTALGIC

## 2020-08-04 ASSESSMENT — LIFESTYLE VARIABLES: SUBSTANCE_ABUSE: 0

## 2020-08-04 NOTE — FLOWSHEET NOTE
08/03/20 1715   Events/Summary/Plan   Events/Summary/Plan Pt is confused. Poor historian.    Vital Signs   Pulse 64   Respiration 16   Pulse Oximetry 92 %   $ Pulse Oximetry (Spot Check) Yes   Respiratory Assessment   Level of Consciousness Confused   Oxygen   O2 (LPM) 0   O2 Delivery Device Room air w/o2 available   Smoking History   Have you ever smoked Never

## 2020-08-04 NOTE — CARE PLAN
Problem: Safety  Goal: Will remain free from injury  Outcome: PROGRESSING AS EXPECTED     Problem: Infection  Goal: Will remain free from infection  Outcome: PROGRESSING AS EXPECTED     Problem: Skin Integrity  Goal: Risk for impaired skin integrity will decrease  Outcome: PROGRESSING AS EXPECTED     Problem: Communication  Goal: The ability to communicate needs accurately and effectively will improve  Outcome: PROGRESSING SLOWER THAN EXPECTED

## 2020-08-04 NOTE — PROGRESS NOTES
Hospital Medicine Daily Progress Note    Date of Service  8/4/2020    Chief Complaint  89 y.o. female admitted 8/3/2020 with hip pain    Hospital Course  This is a 89 years old female who had a ground-level fall at home resulted in right pubic ramus fracture.  Orthopedic surgery consulted who recommended no surgical approach.  Weight bearing as tolerated.  PT is in OT evaluation.  Placed on fall precautions.  Interval Problem Update  Resting comfortably in bed.  Pain is fairly controlled.  Pleasant.  Hemodynamic stable.  No acute distress noted.  No issues overnight per staff.    Consultants/Specialty  Orthopedic surgery    Code Status  Full code    Disposition  Pending SNF acceptance    Review of Systems  Review of Systems   Constitutional: Negative for chills and fever.   HENT: Negative for ear pain, hearing loss and tinnitus.    Eyes: Negative for blurred vision, double vision and photophobia.   Respiratory: Negative for cough, hemoptysis and sputum production.    Cardiovascular: Negative for chest pain and palpitations.   Gastrointestinal: Negative for heartburn, nausea and vomiting.   Genitourinary: Negative for dysuria, frequency and urgency.   Musculoskeletal: Positive for falls. Negative for back pain, myalgias and neck pain.   Skin: Negative for rash.   Neurological: Negative for dizziness, tingling and headaches.   Psychiatric/Behavioral: Negative for depression, substance abuse and suicidal ideas.        Physical Exam  Temp:  [36.8 °C (98.2 °F)-36.9 °C (98.5 °F)] 36.9 °C (98.5 °F)  Pulse:  [61-72] 70  Resp:  [16-17] 17  BP: (115-169)/(57-87) 142/63  SpO2:  [92 %-94 %] 93 %    Physical Exam  Constitutional:       General: She is not in acute distress.     Appearance: She is not ill-appearing.   HENT:      Head: Normocephalic and atraumatic.      Mouth/Throat:      Pharynx: No oropharyngeal exudate or posterior oropharyngeal erythema.   Eyes:      Extraocular Movements: Extraocular movements intact.       Pupils: Pupils are equal, round, and reactive to light.   Cardiovascular:      Rate and Rhythm: Normal rate and regular rhythm.      Heart sounds: No friction rub. No gallop.    Pulmonary:      Effort: Pulmonary effort is normal.      Breath sounds: No stridor.   Abdominal:      General: Abdomen is flat. There is no distension.      Palpations: There is no mass.   Skin:     Coloration: Skin is not jaundiced.   Neurological:      General: No focal deficit present.      Mental Status: She is alert and oriented to person, place, and time.   Psychiatric:         Mood and Affect: Mood normal.         Fluids  No intake or output data in the 24 hours ending 08/04/20 1446    Laboratory  Recent Labs     08/03/20  1040 08/04/20  0514   WBC 8.1 6.6   RBC 4.27 4.17*   HEMOGLOBIN 13.4 13.1   HEMATOCRIT 41.2 41.3   MCV 96.5 99.0*   MCH 31.4 31.4   MCHC 32.5* 31.7*   RDW 46.4 46.5   PLATELETCT 172 150*   MPV 9.9 10.5     Recent Labs     08/03/20  1040 08/04/20  0514   SODIUM 139 138   POTASSIUM 3.5* 4.3   CHLORIDE 101 102   CO2 29 24   GLUCOSE 154* 115*   BUN 23* 20   CREATININE 0.75 0.51   CALCIUM 9.1 9.0                   Imaging  CT-HIP W/O PLUS RECONS RIGHT   Final Result         1. Nondisplaced right inferior pubic ramus fracture. It may be acute to subacute.   2. No hip fracture.   3. Osteopenia.   4. Incidental 4.2 cm right ovarian dermoid cyst.   5. Colonic diverticulosis.      DX-HIP-COMPLETE - UNILATERAL 2+ RIGHT   Final Result      1.  No evidence of right hip fracture.      2.  Mild degenerative change of the hips.           Assessment/Plan  * Right inferior pubic ramus fracture (HCC)- (present on admission)  Assessment & Plan  -Due to mechanical fall.  Unclear of the mechanism of the fall, but she was found by her  on the floor.  She has dementia.  Nonoperative per orthopedics.  - start calcium + vit D. Check Vit D level. Will need outpatient bone scan to evaluate for osteoporosis and need for bisphosphonates.    - continue pain control with oral oxycodone, and IV morphine.  - Continue pharmacologic DVT prophylaxis while in the hospital.  Start subcutaneous Lovenox.  - PT/OT   Pending SNF acceptance      Hypokalemia- (present on admission)  Assessment & Plan  -Replaced with 20 mEq of oral K-Dur.  Check magnesium level and replace if low.  BMP in the morning.    Dementia (HCC)- (present on admission)  Assessment & Plan  -High risk for delirium especially while in the hospital.   Avoid benzodiazepines/anticholinergic medications.  Minimize hypnotics/sedatives/narcotics  Minimize lines  Daily orientation       VTE prophylaxis: Lovenox

## 2020-08-04 NOTE — DISCHARGE PLANNING
Anticipated Discharge Disposition: TBD     Action: LSW following to assist with d/c planning needs.      Barriers to Discharge: None     Plan: LSW to attend IDT rounds for updates, LSW to assist as needed

## 2020-08-04 NOTE — CONSULTS
2020    Daniel Aleman is a 89 y.o. female who presents after a fall with a pelvis fracture and is here for management. Patient denies numbness, parasthesias, loss of conciousness or other trauma    Past Medical History:   Diagnosis Date   • High cholesterol    • Trigeminal neuralgia        Past Surgical History:   Procedure Laterality Date   • GYN SURGERY       x 3   • PRIMARY C SECTION             Medications  No current facility-administered medications on file prior to encounter.      Current Outpatient Medications on File Prior to Encounter   Medication Sig Dispense Refill   • folic acid (FOLVITE) 1 MG Tab Take 1 mg by mouth every day.         Allergies  Patient has no known allergies.    ROS  Pelvic pain. All other systems were reviewed and found to be negative    Family History   Problem Relation Age of Onset   • Heart Disease Neg Hx        Social History     Socioeconomic History   • Marital status:      Spouse name: Not on file   • Number of children: Not on file   • Years of education: Not on file   • Highest education level: Not on file   Occupational History   • Not on file   Social Needs   • Financial resource strain: Not on file   • Food insecurity     Worry: Not on file     Inability: Not on file   • Transportation needs     Medical: Not on file     Non-medical: Not on file   Tobacco Use   • Smoking status: Never Smoker   • Smokeless tobacco: Never Used   Substance and Sexual Activity   • Alcohol use: No   • Drug use: No   • Sexual activity: Not on file   Lifestyle   • Physical activity     Days per week: Not on file     Minutes per session: Not on file   • Stress: Not on file   Relationships   • Social connections     Talks on phone: Not on file     Gets together: Not on file     Attends [employer]ism service: Not on file     Active member of club or organization: Not on file     Attends meetings of clubs or organizations: Not on file     Relationship status: Not on file   •  Intimate partner violence     Fear of current or ex partner: Not on file     Emotionally abused: Not on file     Physically abused: Not on file     Forced sexual activity: Not on file   Other Topics Concern   • Not on file   Social History Narrative   • Not on file       Physical Exam  Vitals  /87   Pulse 67   Temp 36.9 °C (98.5 °F) (Temporal)   Resp 16   Ht 1.524 m (5')   Wt 44.8 kg (98 lb 12.3 oz)   SpO2 93%   General: Well Developed, Well Nourished, no acute distress  HEENT: Normocephalic, atraumatic  Eyes: Anicteric, PERRLA, EOMI  Neck: Supple, nontender, no masses  Lungs: CTA, no wheezes or crackles  Heart: RRR, no murmurs, rubs or gallops  Abdomen: Soft, NT, ND  Pelvis: Stable to AP and Lateral Compression  Skin: Intact, no open wounds  Extremities: LLE, FROM  Neuro: NVI  Vascular: 2+DP/PT, Capillary refill <2 seconds    Radiographs:  CT-HIP W/O PLUS RECONS RIGHT   Final Result         1. Nondisplaced right inferior pubic ramus fracture. It may be acute to subacute.   2. No hip fracture.   3. Osteopenia.   4. Incidental 4.2 cm right ovarian dermoid cyst.   5. Colonic diverticulosis.      DX-HIP-COMPLETE - UNILATERAL 2+ RIGHT   Final Result      1.  No evidence of right hip fracture.      2.  Mild degenerative change of the hips.          Laboratory Values  Recent Labs     08/03/20  1040 08/04/20  0514   WBC 8.1 6.6   RBC 4.27 4.17*   HEMOGLOBIN 13.4 13.1   HEMATOCRIT 41.2 41.3   MCV 96.5 99.0*   MCH 31.4 31.4   MCHC 32.5* 31.7*   RDW 46.4 46.5   PLATELETCT 172 150*   MPV 9.9 10.5     Recent Labs     08/03/20  1040 08/04/20  0514   SODIUM 139 138   POTASSIUM 3.5* 4.3   CHLORIDE 101 102   CO2 29 24   GLUCOSE 154* 115*   BUN 23* 20             Impression:Right pubic ramus fracture    Plan:No surgery required. WBAT. PT/OT

## 2020-08-04 NOTE — THERAPY
"Occupational Therapy   Initial Evaluation     Patient Name: Daniel Aleman  Age:  89 y.o., Sex:  female  Medical Record #: [id number]  Today's Date: 8/4/2020     Precautions  Precautions: Fall Risk, Weight Bearing As Tolerated Right Lower Extremity  Comments: non surgical management    Assessment    Patient is 89 y.o. female with a diagnosis of R inferior pubic ramus fx, non-sx management, after a GLF in the home on 7/31. Pt has a h/o Dementia, and is an unreliable historian, often would say, \"I don't know\" when asked specific information re: home situation. Per pt, she is pretty independent with all ADL and some IADLs such as light meal prep and laundry. She states she does not use AD with walking, and was able to do \"a lot\" at home. Finances, medication, driving, and shopping/groceries are managed by . Today at [facility], pt presents with post-traumatic pain, reduced balance, and decreased activity tolerance -- affecting her ability to perform ADLs and functional txfs safely and independently. She will benefit from acute OT while in house.    Plan    Recommend Occupational Therapy 4 times per week until therapy goals are met for the following treatments:  Adaptive Equipment, Cognitive Skill Development, Community Re-integration, Neuro Re-Education / Balance, Self Care/Activities of Daily Living, Therapeutic Activities and Therapeutic Exercises.    Discharge recommendations:  Recommend post-acute placement for additional occupational therapy services prior to discharge home.       08/04/20 1119   Prior Living Situation   Prior Services Intermittent Physical Support for ADL Per Family   Housing / Facility 1 Story House   Steps Into Home   (unable to determine)   Bathroom Set up Bathtub / Shower Combination   Equipment Owned Unable to Determine At This Time   Lives with - Patient's Self Care Capacity Spouse   Comments pt lives with , but is a poor historian d/t Dementia. Pt unable to recall details of home " situation.   Prior Level of ADL Function   Self Feeding Independent   Grooming / Hygiene Independent   Bathing Independent   Dressing Independent   Toileting Independent   Comments unable to determine accuracy d/t Dementia. pt is a poor historian.   Prior Level of IADL Function   Medication Management Independent   Laundry Independent   Kitchen Mobility Requires Assist   Finances Requires Assist   Home Management Requires Assist   Shopping Requires Assist   Prior Level Of Mobility Independent Without Device in Home;Independent Without Device in Community   Driving / Transportation Relatives / Others Provide Transportation   Occupation (Pre-Hospital Vocational) Retired Due To Age   Comments unable to determine accuracy d/t Dementia. pt is an unreliable historian.   Cognition    Cognition / Consciousness X   Level of Consciousness Confused   Comments pleasant and cooperative, but forgetful   Strength Upper Body   Upper Body Strength  X   Gross Strength Generalized Weakness, Equal Bilaterally.    Bed Mobility    Supine to Sit Minimal Assist   Sit to Supine Minimal Assist   Scooting Supervised   Comments HOB flat, rails up   ADL Assessment   Eating Modified Independent  (with meal setup)   Grooming Standing;Supervision   Upper Body Dressing Minimal Assist   Lower Body Dressing Moderate Assist   Functional Mobility   Sit to Stand Minimal Assist   Bed, Chair, Wheelchair Transfer Minimal Assist   Transfer Method Stand Step  (with FWW)   Mobility in room with FWW   Short Term Goals   Short Term Goal # 1 pt will complete ADL txfs with spv   Short Term Goal # 2 pt will complete LB dressing with spv and PRN AE use   Short Term Goal # 3 pt will complete toileting with spv   Anticipated Discharge Equipment   DC Equipment Unable To Determine At This Time

## 2020-08-04 NOTE — THERAPY
Physical Therapy   Initial Evaluation     Patient Name: Daniel Aleman  Age:  89 y.o., Sex:  female  Medical Record #: [id number]  Today's Date: 8/4/2020          Assessment  Patient is 89 y.o. female with a diagnosis of Pelvic Fx  Following fall at home.Pt lives with  she has a history of dementia so difficult to know Pts prior level of function at this time.Acute PT needed to improve transfers and ambulation       Plan    Recommend Physical Therapy daily until therapy goals are met for the following treatments:  Gait Training, Stair Training, Therapeutic Activities and Therapeutic Exercises      08/04/20 1000   Prior Living Situation   Prior Services None   Housing / Facility Unable To Determine At This Time   Equipment Owned Unable to Determine At This Time   Lives with - Patient's Self Care Capacity Spouse   Prior Level of Functional Mobility   Bed Mobility Independent   Transfer Status Independent   Ambulation Independent   Assistive Devices Used Unable to Determine At This Time   Balance Assessment   Sitting Balance (Static) Fair +   Sitting Balance (Dynamic) Fair   Standing Balance (Static) Fair   Standing Balance (Dynamic) Fair   Weight Shift Sitting Fair   Weight Shift Standing Fair   Gait Analysis   Gait Level Of Assist Minimal Assist   Assistive Device Front Wheel Walker   Distance (Feet) 20   # of Times Distance was Traveled 1   Deviation Antalgic   # of Stairs Climbed 0   Weight Bearing Status wbat   Bed Mobility    Supine to Sit Modified Independent   Sit to Supine Modified Independent   Scooting Minimal Assist   Functional Mobility   Sit to Stand Minimal Assist   Bed, Chair, Wheelchair Transfer Minimal Assist   Transfer Method Stand Step   Activity Tolerance   Sitting Edge of Bed 10   Standing 10   Patient / Family Goals    Patient / Family Goal #1 Home   Short Term Goals    Short Term Goal # 1 Pt to be I with transfers in 6 V so can D/C home   Short Term Goal # 2 Pt to be S with ambulation x 150  feet in 6 V so can D/C home   Problem List    Problems Impaired Transfers;Impaired Ambulation;Functional Strength Deficit   Anticipated Discharge Equipment   DC Equipment Unable To Determine At This Time       Discharge recommendations:  SNF vrs Home

## 2020-08-04 NOTE — PROGRESS NOTES
1900 Report received from MITCH Morales.     1945 Pt calm and awake resting in bed w/o signs of distress. Denies any pain, nausea, chills, SOB. Pt reminded to call when in need of assistance. Cranberry juice given per family () request. Safety and comfort measures in place.  No additional needs at this time. Bed alarm on.

## 2020-08-04 NOTE — CARE PLAN
Problem: Knowledge Deficit  Goal: Knowledge of disease process/condition, treatment plan, diagnostic tests, and medications will improve  Outcome: PROGRESSING AS EXPECTED   Pt educated with treatment plans, safety, oral care, medications, pain management, diet, routine labs. Reoriented and reminded to environment and to use call light when in need of assistance.    Problem: Safety  Goal: Will remain free from injury  Outcome: PROGRESSING AS EXPECTED   Pt educated to call when in need. Call light and personal belongings w/n reach. Room free of clutters. Bed locked and in lowest position. Answer call light immediately. Bed alarm on.     Problem: Communication  Goal: The ability to communicate needs accurately and effectively will improve  Outcome: PROGRESSING AS EXPECTED   Plan of care discussed to pt. Questions and concerns answered. Pt. Verbalized understanding. Communication board updated.

## 2020-08-05 VITALS
BODY MASS INDEX: 19.39 KG/M2 | WEIGHT: 98.77 LBS | HEART RATE: 72 BPM | SYSTOLIC BLOOD PRESSURE: 118 MMHG | RESPIRATION RATE: 18 BRPM | DIASTOLIC BLOOD PRESSURE: 45 MMHG | TEMPERATURE: 98.2 F | OXYGEN SATURATION: 92 % | HEIGHT: 60 IN

## 2020-08-05 PROBLEM — E87.6 HYPOKALEMIA: Status: RESOLVED | Noted: 2020-08-03 | Resolved: 2020-08-05

## 2020-08-05 LAB
SARS-COV-2 RNA RESP QL NAA+PROBE: NOTDETECTED
SPECIMEN SOURCE: NORMAL

## 2020-08-05 PROCEDURE — 99239 HOSP IP/OBS DSCHRG MGMT >30: CPT | Performed by: FAMILY MEDICINE

## 2020-08-05 PROCEDURE — 97530 THERAPEUTIC ACTIVITIES: CPT

## 2020-08-05 PROCEDURE — A9270 NON-COVERED ITEM OR SERVICE: HCPCS | Performed by: INTERNAL MEDICINE

## 2020-08-05 PROCEDURE — 700102 HCHG RX REV CODE 250 W/ 637 OVERRIDE(OP): Performed by: INTERNAL MEDICINE

## 2020-08-05 RX ORDER — POLYETHYLENE GLYCOL 3350 17 G/17G
17 POWDER, FOR SOLUTION ORAL
Refills: 3 | Status: ON HOLD
Start: 2020-08-05 | End: 2021-06-23

## 2020-08-05 RX ORDER — OXYCODONE HYDROCHLORIDE 5 MG/1
5 TABLET ORAL
Qty: 24 TAB | Refills: 0 | Status: SHIPPED | OUTPATIENT
Start: 2020-08-05 | End: 2020-08-08

## 2020-08-05 RX ADMIN — CALCIUM CARBONATE-CHOLECALCIFEROL TAB 250 MG-125 UNIT 1 TABLET: 250-125 TAB at 05:40

## 2020-08-05 ASSESSMENT — ENCOUNTER SYMPTOMS
BLURRED VISION: 0
DIZZINESS: 0
CHILLS: 0
NECK PAIN: 0
NAUSEA: 0
MYALGIAS: 0
HEADACHES: 0
FEVER: 0
HEMOPTYSIS: 0
DEPRESSION: 0
DOUBLE VISION: 0
FALLS: 1
COUGH: 0
PALPITATIONS: 0
HEARTBURN: 0

## 2020-08-05 ASSESSMENT — COGNITIVE AND FUNCTIONAL STATUS - GENERAL
STANDING UP FROM CHAIR USING ARMS: A LITTLE
MOBILITY SCORE: 13
TURNING FROM BACK TO SIDE WHILE IN FLAT BAD: A LOT
WALKING IN HOSPITAL ROOM: A LITTLE
SUGGESTED CMS G CODE MODIFIER MOBILITY: CL
CLIMB 3 TO 5 STEPS WITH RAILING: TOTAL
MOVING FROM LYING ON BACK TO SITTING ON SIDE OF FLAT BED: A LOT
MOVING TO AND FROM BED TO CHAIR: A LOT

## 2020-08-05 ASSESSMENT — GAIT ASSESSMENTS
DEVIATION: ANTALGIC;STEP TO
GAIT LEVEL OF ASSIST: MINIMAL ASSIST
DISTANCE (FEET): 20
ASSISTIVE DEVICE: FRONT WHEEL WALKER

## 2020-08-05 NOTE — DISCHARGE PLANNING
Received Choice form at 1345  Agency/Facility Name: [facility]   Referral sent per Choice form at 1410.

## 2020-08-05 NOTE — PROGRESS NOTES
Received report and assumed care. Pt AOx1 - oriented to self. Denies pain at this time.  Pt mobilized to bedside commode to have BM. Tolerated mobility well.  Pt swabbed for covid and sample sent to lab. Plan of care discussed.   No other needs at this time. Bed alarm on. Call light within reach.

## 2020-08-05 NOTE — DISCHARGE SUMMARY
Discharge Summary    CHIEF COMPLAINT ON ADMISSION  Chief Complaint   Patient presents with   • GLF   • Hip Injury       Reason for Admission  Difficulty Walking, Leg Pain     CODE STATUS  Full Code    HPI & HOSPITAL COURSE  This is a 89 years old female who had a ground-level fall at home resulted in right pubic ramus fracture.  Orthopedic surgery consulted who recommended no surgical approach.  Weight bearing as tolerated.  PT is in OT evaluation.  Placed on fall precautions.  As a clinic patient therapy recommended SNF placement.  Patient was accepted.  Was hemodynamically clinically stable.  Was cleared to discharge from medical standpoint.       Therefore, she is discharged in guarded and stable condition to skilled nursing facility.    The patient met 2-midnight criteria for an inpatient stay at the time of discharge.      FOLLOW UP ITEMS POST DISCHARGE  Follow-up with PCP at SNF as per recommendations    DISCHARGE DIAGNOSES  Principal Problem:    Right inferior pubic ramus fracture (HCC) POA: Yes  Active Problems:    Dementia (HCC) POA: Yes  Resolved Problems:    Hypokalemia POA: Yes      FOLLOW UP  No future appointments.  [address]  [place] [address]  [phone]          MEDICATIONS ON DISCHARGE     Medication List      START taking these medications      Instructions   enoxaparin 30 MG/0.3ML Soln inj  Commonly known as: LOVENOX   Doctor's comments: Until ambulatory > 150feet  Inject 0.3 mL as instructed every evening for 10 days.  Dose: 30 mg     oxyCODONE immediate-release 5 MG Tabs  Commonly known as: ROXICODONE   Take 1 Tab by mouth every 3 hours as needed for up to 3 days.  Dose: 5 mg     oyster shell calcium/vitamin D 250-125 MG-UNIT Tabs tablet  Start taking on: August 6, 2020   Take 1 Tab by mouth every day.  Dose: 1 Tab     polyethylene glycol/lytes 17 g Pack  Commonly known as: MIRALAX   Take 1 Packet by mouth 1 time daily as needed (Constipation).  Dose:  17 g        CONTINUE taking these medications      Instructions   folic acid 1 MG Tabs  Commonly known as: FOLVITE   Take 1 mg by mouth every day.  Dose: 1 mg            Allergies  No Known Allergies    DIET  Orders Placed This Encounter   Procedures   • Diet Order Regular     Standing Status:   Standing     Number of Occurrences:   1     Order Specific Question:   Diet:     Answer:   Regular [1]       ACTIVITY  As tolerated.  Weight bearing as tolerated    LINES, DRAINS, AND WOUNDS  This is an automated list. Peripheral IVs will be removed prior to discharge.                     MENTAL STATUS ON TRANSFER  Level of Consciousness: Confused  Orientation : Disoriented to Place, Disoriented to Time, Disoriented to Event  Speech: Speech Clear    CONSULTATIONS  Orthopedic surgery    PROCEDURES  None    LABORATORY  Lab Results   Component Value Date    SODIUM 138 08/04/2020    POTASSIUM 4.3 08/04/2020    CHLORIDE 102 08/04/2020    CO2 24 08/04/2020    GLUCOSE 115 (H) 08/04/2020    BUN 20 08/04/2020    CREATININE 0.51 08/04/2020        Lab Results   Component Value Date    WBC 6.6 08/04/2020    HEMOGLOBIN 13.1 08/04/2020    HEMATOCRIT 41.3 08/04/2020    PLATELETCT 150 (L) 08/04/2020        Total time of the discharge process exceeds 35 minutes.

## 2020-08-05 NOTE — PROGRESS NOTES
"Spoke with  Duy while he was visiting pt. I asked him about his and his wifes living situation.  She has used a cane for about a year since she first fell. She fell again 3 weeks ago and was having home PT visits 4xweek. He reports that she was getting stronger until she fell last Friday 7/31. He claims that his wife had not been independent before her falls. She needs help with most ADLs. They live in a 2 story house with 3-4 steps inside the door and 16 steps to second floor. Their beds and toilet are downstairs but the shower is upstairs. The shower is tub style with no grab bars; there is a shower seat. No other grab bars in the house.  is considering long term care because he states \"I just can't take care of her anymore by myself\". Duy can be reached for further questions by PT tomorrow 8/5 on his home phone after 1200.     "

## 2020-08-05 NOTE — DISCHARGE PLANNING
Spoke to Howard at [facility]     Transport is scheduled for today at 1630 going to [facility].   Approved services of $40.00 needed for transport.     CCA informed Jennifer at [facility] of pt transport time.

## 2020-08-05 NOTE — PROGRESS NOTES
Held stool softener this morning for loose BM overnight.  Pt required frequent re orientation and reminding about her medications this morning.  No other events overnight.

## 2020-08-05 NOTE — DISCHARGE PLANNING
Care Transition Team Assessment    SW met with Pt bedside to complete assessment.  Pt reports that she lives at home with her spouse.  During interview Pt appeared confused at times so SW asked for permission to call Pt's spouse which Pt provided.  SW called Pt's spouse, Scott to complete assessment.  SW also discussed SNF choice.  Pt's spouse chose [facility] as it was the closest to their home.  SW will continue to monitor and assist as needed.     Information Source  Orientation : Disoriented to Place, Disoriented to Time, Disoriented to Event  Information Given By: Spouse  Informant's Name: Scott    Elopement Risk  Legal Hold: No  Ambulatory or Self Mobile in Wheelchair: No-Not an Elopement Risk  Elopement Risk: Not at Risk for Elopement    Interdisciplinary Discharge Planning  Lives with - Patient's Self Care Capacity: Spouse  Patient or legal guardian wants to designate a caregiver (see row info): No  Housing / Facility: [address] [facility]  Prior Services: Intermittent Physical Support for ADL Per Family    Discharge Preparedness  What is your plan after discharge?: Skilled nursing facility  What are your discharge supports?: Spouse  Prior Functional Level: Needs Assist with Activities of Daily Living    Functional Assesment  Prior Functional Level: Needs Assist with Activities of Daily Living    Finances  Financial Barriers to Discharge: No  Prescription Coverage: Yes    Vision / Hearing Impairment  Vision Impairment : No  Hearing Impairment : No    Domestic Abuse  Have you ever been the victim of abuse or violence?: No  Physical Abuse or Sexual Abuse: No  Verbal Abuse or Emotional Abuse: No  Possible Abuse Reported to:: Not Applicable    Psychological Assessment  History of Substance Abuse: None  History of Psychiatric Problems: No    Discharge Risks or Barriers  Discharge risks or barriers?: No    Anticipated Discharge Information  Discharge Disposition: D/T to SNF with Medicare cert in anticipation of  skilled care (03)

## 2020-08-05 NOTE — THERAPY
Missed Therapy     Patient Name: Daniel Aleman  Age:  89 y.o., Sex:  female  Medical Record #: [id number]  Today's Date: 8/5/2020    Discussed missed therapy with RN. OT tx attempted, however, per RN, pt is transferring to SNF this PM.

## 2020-08-05 NOTE — DISCHARGE PLANNING
SW called Pt's spouse Scott and updated him on Pt's discharge today.  Spouse in parking lot so he will be up to assist with discharge.  SW prepared transfer packet and COBRA and will provide to bedside RN.

## 2020-08-05 NOTE — PROGRESS NOTES
Hospital Medicine Daily Progress Note    Date of Service  8/5/2020    Chief Complaint  89 y.o. female admitted 8/3/2020 with hip pain    Hospital Course  This is a 89 years old female who had a ground-level fall at home resulted in right pubic ramus fracture.  Orthopedic surgery consulted who recommended no surgical approach.  Weight bearing as tolerated.  PT is in OT evaluation.  Placed on fall precautions.  Interval Problem Update  Resting comfortably in bed.  Pain is fairly controlled.  Pleasant.  Hemodynamic stable.  No acute distress noted.  No issues overnight per staff.  8/5: Resting in bed comfortably.  Pain is fairly controlled.  No acute distress noted.  No issues overnight per staff.  Consultants/Specialty  Orthopedic surgery    Code Status  Full code    Disposition  Pending SNF acceptance    Review of Systems  Review of Systems   Constitutional: Negative for chills and fever.   HENT: Negative for ear pain, hearing loss and tinnitus.    Eyes: Negative for blurred vision and double vision.   Respiratory: Negative for cough and hemoptysis.    Cardiovascular: Negative for chest pain and palpitations.   Gastrointestinal: Negative for heartburn and nausea.   Genitourinary: Negative for dysuria and urgency.   Musculoskeletal: Positive for falls. Negative for myalgias and neck pain.   Skin: Negative for rash.   Neurological: Negative for dizziness and headaches.   Psychiatric/Behavioral: Negative for depression and suicidal ideas.        Physical Exam  Temp:  [36.6 °C (97.8 °F)-37 °C (98.6 °F)] 36.6 °C (97.8 °F)  Pulse:  [65-79] 79  Resp:  [18] 18  BP: ()/(34-60) 99/34  SpO2:  [93 %-95 %] 94 %    Physical Exam  Constitutional:       General: She is not in acute distress.     Appearance: She is not ill-appearing.      Comments: Frail     HENT:      Head: Normocephalic and atraumatic.      Mouth/Throat:      Pharynx: No oropharyngeal exudate or posterior oropharyngeal erythema.   Eyes:      Extraocular  Movements: Extraocular movements intact.      Pupils: Pupils are equal, round, and reactive to light.   Cardiovascular:      Rate and Rhythm: Normal rate and regular rhythm.      Heart sounds: No friction rub. No gallop.    Pulmonary:      Effort: Pulmonary effort is normal. No respiratory distress.      Breath sounds: No stridor.   Abdominal:      General: Abdomen is flat. There is no distension.      Palpations: There is no mass.   Skin:     Coloration: Skin is not jaundiced.   Neurological:      General: No focal deficit present.      Mental Status: She is alert and oriented to person, place, and time.   Psychiatric:         Mood and Affect: Mood normal.         Behavior: Behavior normal.         Fluids    Intake/Output Summary (Last 24 hours) at 8/5/2020 1514  Last data filed at 8/5/2020 1200  Gross per 24 hour   Intake 480 ml   Output --   Net 480 ml       Laboratory  Recent Labs     08/03/20  1040 08/04/20  0514   WBC 8.1 6.6   RBC 4.27 4.17*   HEMOGLOBIN 13.4 13.1   HEMATOCRIT 41.2 41.3   MCV 96.5 99.0*   MCH 31.4 31.4   MCHC 32.5* 31.7*   RDW 46.4 46.5   PLATELETCT 172 150*   MPV 9.9 10.5     Recent Labs     08/03/20  1040 08/04/20  0514   SODIUM 139 138   POTASSIUM 3.5* 4.3   CHLORIDE 101 102   CO2 29 24   GLUCOSE 154* 115*   BUN 23* 20   CREATININE 0.75 0.51   CALCIUM 9.1 9.0                   Imaging  CT-HIP W/O PLUS RECONS RIGHT   Final Result         1. Nondisplaced right inferior pubic ramus fracture. It may be acute to subacute.   2. No hip fracture.   3. Osteopenia.   4. Incidental 4.2 cm right ovarian dermoid cyst.   5. Colonic diverticulosis.      DX-HIP-COMPLETE - UNILATERAL 2+ RIGHT   Final Result      1.  No evidence of right hip fracture.      2.  Mild degenerative change of the hips.           Assessment/Plan  * Right inferior pubic ramus fracture (HCC)- (present on admission)  Assessment & Plan  -Due to mechanical fall.  Unclear of the mechanism of the fall, but she was found by her   on the floor.  She has dementia.  Nonoperative per orthopedics.  - start calcium + vit D. Check Vit D level. Will need outpatient bone scan to evaluate for osteoporosis and need for bisphosphonates.   - continue pain control with oral oxycodone, and IV morphine.  - Continue pharmacologic DVT prophylaxis while in the hospital.  Start subcutaneous Lovenox.  - PT/OT   Pending SNF acceptance      Hypokalemia- (present on admission)  Assessment & Plan  -Replaced and corrected     Dementia (HCC)- (present on admission)  Assessment & Plan  -High risk for delirium especially while in the hospital.   Avoid benzodiazepines/anticholinergic medications.  Minimize hypnotics/sedatives/narcotics  Minimize lines  Daily orientation       VTE prophylaxis: Lovenox

## 2020-08-05 NOTE — PROGRESS NOTES
[facility] Patient is A&O to self only,  by her side.

## 2020-08-05 NOTE — DISCHARGE INSTRUCTIONS
Discharge Instructions    Discharged to other by medical transportation with escort. Discharged via wheelchair, hospital escort: Yes.  Special equipment needed: None    Be sure to schedule a follow-up appointment with your primary care doctor or any specialists as instructed.     Discharge Plan:   Diet Plan: Discussed  Activity Level: Discussed  Confirmed Follow up Appointment: Patient to Call and Schedule Appointment  Confirmed Symptoms Management: Discussed  Medication Reconciliation Updated: Yes    I understand that a diet low in cholesterol, fat, and sodium is recommended for good health. Unless I have been given specific instructions below for another diet, I accept this instruction as my diet prescription.   Other diet: Diet prior to arrival    Special Instructions: None    · Is patient discharged on Warfarin / Coumadin?   No     Depression / Suicide Risk    As you are discharged from this [facility] facility, it is important to learn how to keep safe from harming yourself.    Recognize the warning signs:  · Abrupt changes in personality, positive or negative- including increase in energy   · Giving away possessions  · Change in eating patterns- significant weight changes-  positive or negative  · Change in sleeping patterns- unable to sleep or sleeping all the time   · Unwillingness or inability to communicate  · Depression  · Unusual sadness, discouragement and loneliness  · Talk of wanting to die  · Neglect of personal appearance   · Rebelliousness- reckless behavior  · Withdrawal from people/activities they love  · Confusion- inability to concentrate     If you or a loved one observes any of these behaviors or has concerns about self-harm, here's what you can do:  · Talk about it- your feelings and reasons for harming yourself  · Remove any means that you might use to hurt yourself (examples: pills, rope, extension cords, firearm)  · Get professional help from the community (Mental Health, Substance  Abuse, psychological counseling)  · Do not be alone:Call your Safe Contact- someone whom you trust who will be there for you.  · Call your local CRISIS HOTLINE 652-1281 or 847-538-3486  · Call your local Children's Mobile Crisis Response Team [place] (080) 656-6604 or www.[employer]  · Call the toll free National Suicide Prevention Hotlines   · National Suicide Prevention Lifeline 455-648-FBLF (5163)  · National Hope Line Network 800-SUICIDE (542-7819)

## 2020-08-05 NOTE — THERAPY
Physical Therapy   Daily Treatment     Patient Name: Daniel Aleman  Age:  89 y.o., Sex:  female  Medical Record #: [id number]  Today's Date: 8/5/2020     Precautions: Fall Risk, Weight Bearing As Tolerated Right Lower Extremity    Assessment    Pt making slow progress, limited WB tolerance R LE with gait, shuffled, slow gait with FWW. Pt is appropriate for DC to SNF for further strengthening and mobility training. Suspect spouse will be unable to care for pt at [facility].       Plan    Treatment plan modified to 4 times per week until therapy goals are met for the following treatments:  Bed Mobility, Gait Training, Neuro Re-Education / Balance, Stair Training, Therapeutic Activities and Therapeutic Exercises.    DC Equipment Recommendations: Unable to determine at this time  Discharge Recommendations: Recommend post-acute placement for additional physical therapy services prior to discharge home       08/05/20 1201   Cognition    Comments Pt agreeable for PT, some confusion but follows simple commands   Gait Analysis   Gait Level Of Assist Minimal Assist   Assistive Device Front Wheel Walker   Distance (Feet) 20   # of Times Distance was Traveled 1   Deviation Antalgic;Step To  (tends to drag L LE)   Weight Bearing Status WBAT R LE   Bed Mobility    Supine to Sit   (NT, pt sitting on toilet upon entry)   Sit to Supine Moderate Assist   Functional Mobility   Sit to Stand Minimal Assist   Bed, Chair, Wheelchair Transfer Minimal Assist   Transfer Method Stand Step  (with FWW)   Short Term Goals    Short Term Goal # 1 Pt to be I with transfers in 6 V so can D/C home   Goal Outcome # 1 goal not met   Short Term Goal # 2 Pt to be S with ambulation x 150 feet in 6 V so can D/C home   Goal Outcome # 2 Goal not met

## 2020-08-06 NOTE — DISCHARGE PLANNING
\[facility]\"" #    [id number]      Provided number to Jennifer at [facility].

## 2020-11-17 ENCOUNTER — HOSPITAL ENCOUNTER (OUTPATIENT)
Dept: LAB | Facility: MEDICAL CENTER | Age: 85
End: 2020-11-17
Attending: FAMILY MEDICINE
Payer: MEDICARE

## 2020-11-17 LAB
ALBUMIN SERPL BCP-MCNC: 4 G/DL (ref 3.2–4.9)
ALBUMIN/GLOB SERPL: 1.2 G/DL
ALP SERPL-CCNC: 99 U/L (ref 30–99)
ALT SERPL-CCNC: 9 U/L (ref 2–50)
ANION GAP SERPL CALC-SCNC: 7 MMOL/L (ref 7–16)
AST SERPL-CCNC: 17 U/L (ref 12–45)
BASOPHILS # BLD AUTO: 0.8 % (ref 0–1.8)
BASOPHILS # BLD: 0.05 K/UL (ref 0–0.12)
BILIRUB SERPL-MCNC: 0.4 MG/DL (ref 0.1–1.5)
BUN SERPL-MCNC: 14 MG/DL (ref 8–22)
CALCIUM SERPL-MCNC: 9.6 MG/DL (ref 8.5–10.5)
CHLORIDE SERPL-SCNC: 102 MMOL/L (ref 96–112)
CO2 SERPL-SCNC: 31 MMOL/L (ref 20–33)
CREAT SERPL-MCNC: 0.64 MG/DL (ref 0.5–1.4)
EOSINOPHIL # BLD AUTO: 0.22 K/UL (ref 0–0.51)
EOSINOPHIL NFR BLD: 3.6 % (ref 0–6.9)
ERYTHROCYTE [DISTWIDTH] IN BLOOD BY AUTOMATED COUNT: 47.9 FL (ref 35.9–50)
EST. AVERAGE GLUCOSE BLD GHB EST-MCNC: 157 MG/DL
GLOBULIN SER CALC-MCNC: 3.4 G/DL (ref 1.9–3.5)
GLUCOSE SERPL-MCNC: 123 MG/DL (ref 65–99)
HBA1C MFR BLD: 7.1 % (ref 0–5.6)
HCT VFR BLD AUTO: 44.7 % (ref 37–47)
HGB BLD-MCNC: 14.2 G/DL (ref 12–16)
IMM GRANULOCYTES # BLD AUTO: 0.02 K/UL (ref 0–0.11)
IMM GRANULOCYTES NFR BLD AUTO: 0.3 % (ref 0–0.9)
LYMPHOCYTES # BLD AUTO: 1.67 K/UL (ref 1–4.8)
LYMPHOCYTES NFR BLD: 27.3 % (ref 22–41)
MCH RBC QN AUTO: 31.5 PG (ref 27–33)
MCHC RBC AUTO-ENTMCNC: 31.8 G/DL (ref 33.6–35)
MCV RBC AUTO: 99.1 FL (ref 81.4–97.8)
MONOCYTES # BLD AUTO: 0.51 K/UL (ref 0–0.85)
MONOCYTES NFR BLD AUTO: 8.3 % (ref 0–13.4)
NEUTROPHILS # BLD AUTO: 3.64 K/UL (ref 2–7.15)
NEUTROPHILS NFR BLD: 59.7 % (ref 44–72)
NRBC # BLD AUTO: 0 K/UL
NRBC BLD-RTO: 0 /100 WBC
PLATELET # BLD AUTO: 251 K/UL (ref 164–446)
PMV BLD AUTO: 10.2 FL (ref 9–12.9)
POTASSIUM SERPL-SCNC: 3.9 MMOL/L (ref 3.6–5.5)
PROT SERPL-MCNC: 7.4 G/DL (ref 6–8.2)
RBC # BLD AUTO: 4.51 M/UL (ref 4.2–5.4)
SODIUM SERPL-SCNC: 140 MMOL/L (ref 135–145)
T4 FREE SERPL-MCNC: 1.15 NG/DL (ref 0.93–1.7)
TSH SERPL DL<=0.005 MIU/L-ACNC: 2.76 UIU/ML (ref 0.38–5.33)
WBC # BLD AUTO: 6.1 K/UL (ref 4.8–10.8)

## 2020-11-17 PROCEDURE — 83036 HEMOGLOBIN GLYCOSYLATED A1C: CPT

## 2020-11-17 PROCEDURE — 84443 ASSAY THYROID STIM HORMONE: CPT

## 2020-11-17 PROCEDURE — 85025 COMPLETE CBC W/AUTO DIFF WBC: CPT

## 2020-11-17 PROCEDURE — 84439 ASSAY OF FREE THYROXINE: CPT

## 2020-11-17 PROCEDURE — 80053 COMPREHEN METABOLIC PANEL: CPT

## 2020-11-17 PROCEDURE — 36415 COLL VENOUS BLD VENIPUNCTURE: CPT

## 2021-01-11 DIAGNOSIS — Z23 NEED FOR VACCINATION: ICD-10-CM

## 2021-05-25 ENCOUNTER — PATIENT OUTREACH (OUTPATIENT)
Dept: HEALTH INFORMATION MANAGEMENT | Facility: OTHER | Age: 86
End: 2021-05-25

## 2021-05-25 NOTE — PROGRESS NOTES
[facility]. Per  they will call us back if interested. HIPAA verified.      Attempt #1

## 2021-06-21 ENCOUNTER — APPOINTMENT (OUTPATIENT)
Dept: RADIOLOGY | Facility: MEDICAL CENTER | Age: 86
DRG: 189 | End: 2021-06-21
Attending: EMERGENCY MEDICINE
Payer: MEDICARE

## 2021-06-21 ENCOUNTER — HOSPITAL ENCOUNTER (INPATIENT)
Facility: MEDICAL CENTER | Age: 86
LOS: 2 days | DRG: 189 | End: 2021-06-23
Attending: EMERGENCY MEDICINE | Admitting: STUDENT IN AN ORGANIZED HEALTH CARE EDUCATION/TRAINING PROGRAM
Payer: MEDICARE

## 2021-06-21 DIAGNOSIS — Z87.828 HISTORY OF PELVIC TRAUMA: ICD-10-CM

## 2021-06-21 DIAGNOSIS — R26.2 UNABLE TO AMBULATE: ICD-10-CM
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
## 2021-06-21 PROBLEM — J96.01 ACUTE RESPIRATORY FAILURE WITH HYPOXIA (HCC): Status: ACTIVE | Noted: 2021-06-21

## 2021-06-21 PROBLEM — E11.9 TYPE 2 DIABETES MELLITUS WITHOUT COMPLICATION, WITHOUT LONG-TERM CURRENT USE OF INSULIN (HCC): Status: ACTIVE | Noted: 2021-06-21

## 2021-06-21 LAB
ALBUMIN SERPL BCP-MCNC: 3.6 G/DL (ref 3.2–4.9)
ALBUMIN/GLOB SERPL: 1.1 G/DL
ALP SERPL-CCNC: 96 U/L (ref 30–99)
ALT SERPL-CCNC: 10 U/L (ref 2–50)
ANION GAP SERPL CALC-SCNC: 11 MMOL/L (ref 7–16)
AST SERPL-CCNC: 14 U/L (ref 12–45)
BASOPHILS # BLD AUTO: 0.2 % (ref 0–1.8)
BASOPHILS # BLD: 0.03 K/UL (ref 0–0.12)
BILIRUB SERPL-MCNC: 0.4 MG/DL (ref 0.1–1.5)
BUN SERPL-MCNC: 17 MG/DL (ref 8–22)
CALCIUM SERPL-MCNC: 8.6 MG/DL (ref 8.4–10.2)
CHLORIDE SERPL-SCNC: 104 MMOL/L (ref 96–112)
CO2 SERPL-SCNC: 25 MMOL/L (ref 20–33)
CREAT SERPL-MCNC: 0.63 MG/DL (ref 0.5–1.4)
EOSINOPHIL # BLD AUTO: 0.01 K/UL (ref 0–0.51)
EOSINOPHIL NFR BLD: 0.1 % (ref 0–6.9)
ERYTHROCYTE [DISTWIDTH] IN BLOOD BY AUTOMATED COUNT: 45.7 FL (ref 35.9–50)
GLOBULIN SER CALC-MCNC: 3.4 G/DL (ref 1.9–3.5)
GLUCOSE SERPL-MCNC: 199 MG/DL (ref 65–99)
HCT VFR BLD AUTO: 40.6 % (ref 37–47)
HGB BLD-MCNC: 13.3 G/DL (ref 12–16)
IMM GRANULOCYTES # BLD AUTO: 0.11 K/UL (ref 0–0.11)
IMM GRANULOCYTES NFR BLD AUTO: 0.9 % (ref 0–0.9)
LYMPHOCYTES # BLD AUTO: 0.46 K/UL (ref 1–4.8)
LYMPHOCYTES NFR BLD: 3.8 % (ref 22–41)
MCH RBC QN AUTO: 31.9 PG (ref 27–33)
MCHC RBC AUTO-ENTMCNC: 32.8 G/DL (ref 33.6–35)
MCV RBC AUTO: 97.4 FL (ref 81.4–97.8)
MONOCYTES # BLD AUTO: 0.71 K/UL (ref 0–0.85)
MONOCYTES NFR BLD AUTO: 5.9 % (ref 0–13.4)
NEUTROPHILS # BLD AUTO: 10.72 K/UL (ref 2–7.15)
NEUTROPHILS NFR BLD: 89.1 % (ref 44–72)
NRBC # BLD AUTO: 0 K/UL
NRBC BLD-RTO: 0 /100 WBC
PLATELET # BLD AUTO: 218 K/UL (ref 164–446)
PMV BLD AUTO: 9.6 FL (ref 9–12.9)
POTASSIUM SERPL-SCNC: 3.7 MMOL/L (ref 3.6–5.5)
PROCALCITONIN SERPL-MCNC: 0.12 NG/ML
PROT SERPL-MCNC: 7 G/DL (ref 6–8.2)
RBC # BLD AUTO: 4.17 M/UL (ref 4.2–5.4)
SODIUM SERPL-SCNC: 140 MMOL/L (ref 135–145)
WBC # BLD AUTO: 12 K/UL (ref 4.8–10.8)

## 2021-06-21 PROCEDURE — 99285 EMERGENCY DEPT VISIT HI MDM: CPT

## 2021-06-21 PROCEDURE — 71045 X-RAY EXAM CHEST 1 VIEW: CPT

## 2021-06-21 PROCEDURE — 73502 X-RAY EXAM HIP UNI 2-3 VIEWS: CPT | Mod: RT

## 2021-06-21 PROCEDURE — 770006 HCHG ROOM/CARE - MED/SURG/GYN SEMI*

## 2021-06-21 PROCEDURE — 85025 COMPLETE CBC W/AUTO DIFF WBC: CPT

## 2021-06-21 PROCEDURE — 84145 PROCALCITONIN (PCT): CPT

## 2021-06-21 PROCEDURE — U0005 INFEC AGEN DETEC AMPLI PROBE: HCPCS

## 2021-06-21 PROCEDURE — 99222 1ST HOSP IP/OBS MODERATE 55: CPT | Mod: AI | Performed by: STUDENT IN AN ORGANIZED HEALTH CARE EDUCATION/TRAINING PROGRAM

## 2021-06-21 PROCEDURE — U0003 INFECTIOUS AGENT DETECTION BY NUCLEIC ACID (DNA OR RNA); SEVERE ACUTE RESPIRATORY SYNDROME CORONAVIRUS 2 (SARS-COV-2) (CORONAVIRUS DISEASE [COVID-19]), AMPLIFIED PROBE TECHNIQUE, MAKING USE OF HIGH THROUGHPUT TECHNOLOGIES AS DESCRIBED BY CMS-2020-01-R: HCPCS

## 2021-06-21 PROCEDURE — 72192 CT PELVIS W/O DYE: CPT | Mod: MG

## 2021-06-21 PROCEDURE — 80053 COMPREHEN METABOLIC PANEL: CPT

## 2021-06-21 RX ORDER — MORPHINE SULFATE 4 MG/ML
4 INJECTION, SOLUTION INTRAMUSCULAR; INTRAVENOUS ONCE
Status: ACTIVE | OUTPATIENT
Start: 2021-06-21 | End: 2021-06-22

## 2021-06-21 RX ORDER — ONDANSETRON 2 MG/ML
4 INJECTION INTRAMUSCULAR; INTRAVENOUS ONCE
Status: ACTIVE | OUTPATIENT
Start: 2021-06-21 | End: 2021-06-22

## 2021-06-21 ASSESSMENT — LIFESTYLE VARIABLES
HAVE PEOPLE ANNOYED YOU BY CRITICIZING YOUR DRINKING: NO
TOTAL SCORE: 0
EVER HAD A DRINK FIRST THING IN THE MORNING TO STEADY YOUR NERVES TO GET RID OF A HANGOVER: NO
ALCOHOL_USE: NO
TOTAL SCORE: 0
CONSUMPTION TOTAL: NEGATIVE
HAVE YOU EVER FELT YOU SHOULD CUT DOWN ON YOUR DRINKING: NO
TOTAL SCORE: 0
HOW MANY TIMES IN THE PAST YEAR HAVE YOU HAD 5 OR MORE DRINKS IN A DAY: 0
AVERAGE NUMBER OF DAYS PER WEEK YOU HAVE A DRINK CONTAINING ALCOHOL: 0
ON A TYPICAL DAY WHEN YOU DRINK ALCOHOL HOW MANY DRINKS DO YOU HAVE: 0
EVER FELT BAD OR GUILTY ABOUT YOUR DRINKING: NO

## 2021-06-21 ASSESSMENT — ENCOUNTER SYMPTOMS
SHORTNESS OF BREATH: 0
FEVER: 0
CHILLS: 0
DIARRHEA: 0
COUGH: 0
ABDOMINAL PAIN: 0
PALPITATIONS: 0
FALLS: 1
CONSTIPATION: 0

## 2021-06-21 ASSESSMENT — PATIENT HEALTH QUESTIONNAIRE - PHQ9
SUM OF ALL RESPONSES TO PHQ9 QUESTIONS 1 AND 2: 0
2. FEELING DOWN, DEPRESSED, IRRITABLE, OR HOPELESS: NOT AT ALL
1. LITTLE INTEREST OR PLEASURE IN DOING THINGS: NOT AT ALL

## 2021-06-21 ASSESSMENT — COGNITIVE AND FUNCTIONAL STATUS - GENERAL
DRESSING REGULAR LOWER BODY CLOTHING: A LOT
PERSONAL GROOMING: A LITTLE
MOVING FROM LYING ON BACK TO SITTING ON SIDE OF FLAT BED: UNABLE
DRESSING REGULAR UPPER BODY CLOTHING: A LITTLE
SUGGESTED CMS G CODE MODIFIER DAILY ACTIVITY: CK
EATING MEALS: A LITTLE
STANDING UP FROM CHAIR USING ARMS: A LOT
WALKING IN HOSPITAL ROOM: A LOT
MOVING TO AND FROM BED TO CHAIR: A LOT
DAILY ACTIVITIY SCORE: 15
HELP NEEDED FOR BATHING: A LOT
TURNING FROM BACK TO SIDE WHILE IN FLAT BAD: A LOT
CLIMB 3 TO 5 STEPS WITH RAILING: A LOT
TOILETING: A LOT
SUGGESTED CMS G CODE MODIFIER MOBILITY: CL
MOBILITY SCORE: 11

## 2021-06-21 ASSESSMENT — PAIN DESCRIPTION - PAIN TYPE: TYPE: ACUTE PAIN

## 2021-06-21 ASSESSMENT — FIBROSIS 4 INDEX: FIB4 SCORE: 2.12

## 2021-06-21 NOTE — ED PROVIDER NOTES
ED Provider Note    CHIEF COMPLAINT  Chief Complaint   Patient presents with   • GLF     while in the bathroom cleaning herself up the  went to check on her and accedently nudged her with the door and she fell over    • Pelvic Pain     right side pain, pt states its not the hip. Hx of pelvix fracture        HPI  Daniel Aleman is a 89 y.o. female who presents after falling at home, the patient was in the bathroom and her  opened the door which slightly noted to her causing her to fall to the ground.  She presents with right-sided pelvic pain.  She denies head injury or loss of consciousness.  The pain is severe.  Previously the patient was diagnosed with a pelvic fracture that cannot be seen on plain x-ray.    REVIEW OF SYSTEMS  See HPI for further details. All other systems are negative.     PAST MEDICAL HISTORY   has a past medical history of High cholesterol and Trigeminal neuralgia.    SOCIAL HISTORY  Social History     Tobacco Use   • Smoking status: Never Smoker   • Smokeless tobacco: Never Used   Substance and Sexual Activity   • Alcohol use: No   • Drug use: No   • Sexual activity: Not on file       SURGICAL HISTORY   has a past surgical history that includes gyn surgery and primary c section.    CURRENT MEDICATIONS  Home Medications     Reviewed by Paco Perea (Pharmacy Tech) on 06/21/21 at 1622  Med List Status: Complete   Medication Last Dose Status   Calcium Carb-Cholecalciferol (OYSTER SHELL CALCIUM/VITAMIN D) 250-125 MG-UNIT Tab tablet NOT TAKING Active   Cyanocobalamin (VITAMIN B-12 PO) FEW DAYS AGO Active   metFORMIN (GLUCOPHAGE) 500 MG Tab FEW DAYS AGO Active   polyethylene glycol/lytes (MIRALAX) 17 g Pack NOT TAKING Active   VITAMIN D PO FEW DAYS AGO Active                  ALLERGIES  No Known Allergies    PHYSICAL EXAM  VITAL SIGNS: /61   Pulse 77   Temp 36.6 °C (97.8 °F) (Temporal)   Resp (!) 31   Ht 1.524 m (5')   Wt 47.6 kg (105 lb)   SpO2 100%   BMI 20.51 kg/m²   @[facility][[id number]::@   Pulse ox interpretation: I interpret this pulse ox as normal.  Constitutional: Alert in no apparent distress.  HENT: No signs of trauma, Bilateral external ears normal, Nose normal.   Eyes: Pupils are equal and reactive, Conjunctiva normal, Non-icteric.   Neck: Normal range of motion, No tenderness, Supple, No stridor.   Lymphatic: No lymphadenopathy noted.   Cardiovascular: Regular rate and rhythm, no murmurs.   Thorax & Lungs: Normal breath sounds, No respiratory distress, No wheezing, No chest tenderness.   Abdomen: Bowel sounds normal, Soft, No tenderness, No masses, No pulsatile masses. No peritoneal signs.  Skin: Warm, Dry, No erythema, No rash.   Back: No bony tenderness, No CVA tenderness.   Extremities: Intact distal pulses, No edema, No tenderness, No cyanosis.  Musculoskeletal: The patient has pain with range of motion of the right lower extremity.  She has no obvious femur tenderness, she points to her right groin where she has pain.  Neurologic: Alert , Normal motor function, Normal sensory function, No focal deficits noted.   Psychiatric: Affect normal, Judgment normal, Mood normal.       DIAGNOSTIC STUDIES / PROCEDURES        LABS  Labs Reviewed   SARS-COV-2, PCR (IN-HOUSE)   CBC WITH DIFFERENTIAL   COMP METABOLIC PANEL         RADIOLOGY  DX-HIP-COMPLETE - UNILATERAL 2+ RIGHT   Final Result         Irregular lucencies seen in the right inferior pubic ramus, concerning for fracture.      CT-PELVIS W/O    (Results Pending)   DX-CHEST-PORTABLE (1 VIEW)    (Results Pending)           COURSE & MEDICAL DECISION MAKING  Pertinent Labs & Imaging studies reviewed. (See chart for details)    Differential diagnosis: Pelvic fracture, hip fracture    Patient was given ibuprofen and Tylenol p.o.  The x-ray is concerning for an inferior pubic ramus fracture on the right.  This is where the patient's pain is.  I have ordered a CT to confirm.    The patient is incidentally hypoxic on room air.  She  is placed on oxygen.  She has had no cough or fever recently. I spoke with Dr. Santos who will assess the patient for hospitalization.         FINAL IMPRESSION  1. Pelvic trauma   2. hypoxia  3.         Electronically signed by: Kumar Kurtz M.D., 6/21/2021 3:36 PM

## 2021-06-21 NOTE — ED TRIAGE NOTES
Chief Complaint   Patient presents with   • GLF     while in the bathroom cleaning herself up the  went to check on her and accedently nudged her with the door and she fell over    • Pelvic Pain     right side pain, pt states its not the hip. Hx of pelvix fracture      /61   Pulse 83   Temp 36.6 °C (97.8 °F) (Temporal)   Resp 16   Ht 1.524 m (5')   Wt 47.6 kg (105 lb)   BMI 20.51 kg/m²

## 2021-06-22 ENCOUNTER — APPOINTMENT (OUTPATIENT)
Dept: CARDIOLOGY | Facility: MEDICAL CENTER | Age: 86
DRG: 189 | End: 2021-06-22
Attending: STUDENT IN AN ORGANIZED HEALTH CARE EDUCATION/TRAINING PROGRAM
Payer: MEDICARE

## 2021-06-22 PROBLEM — Z71.89 ADVANCED DIRECTIVES, COUNSELING/DISCUSSION: Status: ACTIVE | Noted: 2021-06-22

## 2021-06-22 LAB
LV EJECT FRACT  99904: 75
LV EJECT FRACT MOD 2C 99903: 83.1
LV EJECT FRACT MOD 4C 99902: 81.54
LV EJECT FRACT MOD BP 99901: 81.53
SARS-COV-2 RNA RESP QL NAA+PROBE: NOTDETECTED
SPECIMEN SOURCE: NORMAL

## 2021-06-22 PROCEDURE — 97165 OT EVAL LOW COMPLEX 30 MIN: CPT

## 2021-06-22 PROCEDURE — 770006 HCHG ROOM/CARE - MED/SURG/GYN SEMI*

## 2021-06-22 PROCEDURE — 93306 TTE W/DOPPLER COMPLETE: CPT

## 2021-06-22 PROCEDURE — 99233 SBSQ HOSP IP/OBS HIGH 50: CPT | Performed by: INTERNAL MEDICINE

## 2021-06-22 PROCEDURE — 97162 PT EVAL MOD COMPLEX 30 MIN: CPT

## 2021-06-22 PROCEDURE — 700111 HCHG RX REV CODE 636 W/ 250 OVERRIDE (IP): Performed by: INTERNAL MEDICINE

## 2021-06-22 PROCEDURE — 92610 EVALUATE SWALLOWING FUNCTION: CPT

## 2021-06-22 PROCEDURE — 93306 TTE W/DOPPLER COMPLETE: CPT | Mod: 26 | Performed by: INTERNAL MEDICINE

## 2021-06-22 RX ORDER — BISACODYL 10 MG
10 SUPPOSITORY, RECTAL RECTAL
Status: DISCONTINUED | OUTPATIENT
Start: 2021-06-22 | End: 2021-06-23 | Stop reason: HOSPADM

## 2021-06-22 RX ORDER — POLYETHYLENE GLYCOL 3350 17 G/17G
1 POWDER, FOR SOLUTION ORAL
Status: DISCONTINUED | OUTPATIENT
Start: 2021-06-22 | End: 2021-06-23 | Stop reason: HOSPADM

## 2021-06-22 RX ORDER — AMOXICILLIN 250 MG
2 CAPSULE ORAL 2 TIMES DAILY
Status: DISCONTINUED | OUTPATIENT
Start: 2021-06-22 | End: 2021-06-23 | Stop reason: HOSPADM

## 2021-06-22 RX ADMIN — ENOXAPARIN SODIUM 40 MG: 40 INJECTION SUBCUTANEOUS at 09:00

## 2021-06-22 ASSESSMENT — COGNITIVE AND FUNCTIONAL STATUS - GENERAL
DRESSING REGULAR LOWER BODY CLOTHING: A LOT
WALKING IN HOSPITAL ROOM: TOTAL
SUGGESTED CMS G CODE MODIFIER DAILY ACTIVITY: CK
MOVING FROM LYING ON BACK TO SITTING ON SIDE OF FLAT BED: A LOT
EATING MEALS: A LITTLE
DRESSING REGULAR UPPER BODY CLOTHING: A LITTLE
PERSONAL GROOMING: A LITTLE
TOILETING: A LITTLE
TURNING FROM BACK TO SIDE WHILE IN FLAT BAD: A LOT
CLIMB 3 TO 5 STEPS WITH RAILING: TOTAL
STANDING UP FROM CHAIR USING ARMS: A LITTLE
MOBILITY SCORE: 11
DAILY ACTIVITIY SCORE: 16
MOVING TO AND FROM BED TO CHAIR: A LOT
HELP NEEDED FOR BATHING: A LOT
SUGGESTED CMS G CODE MODIFIER MOBILITY: CL

## 2021-06-22 ASSESSMENT — GAIT ASSESSMENTS
DISTANCE (FEET): 3
GAIT LEVEL OF ASSIST: UNABLE TO PARTICIPATE

## 2021-06-22 ASSESSMENT — PAIN DESCRIPTION - PAIN TYPE: TYPE: ACUTE PAIN

## 2021-06-22 ASSESSMENT — ACTIVITIES OF DAILY LIVING (ADL): TOILETING: REQUIRES ASSIST

## 2021-06-22 NOTE — THERAPY
Physical Therapy   Initial Evaluation     Patient Name: Daniel Aleman  Age:  89 y.o., Sex:  female  Medical Record #: [id number]  Today's Date: 6/22/2021     Precautions: Fall Risk, Swallow Precautions ( See Comments)    Assessment  Patient is 89 y.o. female s/p fall resulting in R groin/pelvic pain. CT is negative for a fracture. Pt continues to have R groin pain with any WB, unable to ambulate today but was able to transfer to/from [facility] with FWW Cathy.  If pt is unable to ambulate then home is unsafe at this time, recommend further therapy at SNF prior to DC home.         Plan    Recommend Physical Therapy 5 times per week until therapy goals are met for the following treatments:  Bed Mobility, Gait Training, Neuro Re-Education / Balance, Stair Training, Therapeutic Activities and Therapeutic Exercises    DC Equipment Recommendations: None  Discharge Recommendations: Recommend post-acute placement for additional physical therapy services prior to discharge home        06/22/21 1028   Prior Living Situation   Housing / Facility 2 Story House   Steps Into Home 1   Equipment Owned Front-Wheel Walker;Single Point Cane   Lives with - Patient's Self Care Capacity Spouse;Adult Children   Comments Pt can stay on main level but shower is upstairs   Prior Level of Functional Mobility   Bed Mobility Independent   Transfer Status Independent   Ambulation Independent   Assistive Devices Used Single Point Cane   Stairs Required Assist   History of Falls   Date of Last Fall 06/21/21   Cognition    Level of Consciousness Alert   Comments Oriented to name only, follows all simple commands   Passive ROM Lower Body   Passive ROM Lower Body X   Comments pain with R hip abduction    Active ROM Lower Body    Active ROM Lower Body  X   Comments limited by weakness and mild pain   Strength Lower Body   Lower Body Strength  X   Comments R LE grossly 4-/5, L LE 4/5   Balance Assessment   Sitting Balance (Static) Fair +   Sitting Balance  (Dynamic) Fair   Standing Balance (Static) Fair -   Standing Balance (Dynamic) Poor   Weight Shift Sitting Fair   Weight Shift Standing Poor   Comments stdg with FWW   Gait Analysis   Gait Level Of Assist Unable to Participate   Bed Mobility    Supine to Sit   (NT, pt sitting on BSC upon entry)   Sit to Supine Moderate Assist   Functional Mobility   Sit to Stand Minimal Assist   Bed, Chair, Wheelchair Transfer Minimal Assist   Transfer Method Stand Step  (with FWW)   Comments decreased WB tolerance R LE   Activity Tolerance   Standing 1 min during transfer   Short Term Goals    Short Term Goal # 1 Pt will be able to perform bed mobility and sup <> sit Janina in 6 visits.   Short Term Goal # 2 pt will be able to perform sit <> stand and transfer Janina in 6 visits so can DC home safely.   Short Term Goal # 3 Pt will be able to ambulate 100 ft with FWW in 6 visits so can DC home safely.   Short Term Goal # 4 Pt will be able to go up/down 1 step with FWW Janina in 6 visits so can enter home safely.

## 2021-06-22 NOTE — THERAPY
Occupational Therapy   Initial Evaluation     Patient Name: Daniel Aleman  Age:  89 y.o., Sex:  female  Medical Record #: [id number]  Today's Date: 6/22/2021     Precautions  Precautions: Fall Risk, Swallow Precautions ( See Comments)    Assessment  Patient is 89 y.o. female with a diagnosis of R pelvic and groin pain s/p GLF. Imaging shows no acute fx. Additional factors influencing patient status / progress: acute respiratory failure with hypoxia, DM 2, Dementia.  reports she had a similar fall in the past, and underwent rehab at [facility]. Pt lives with  who is her primary caregiver, d/t her Dementia. Per , she normally receives assistance from him with ADLs, but is dependent with IADLs. Pt apparently utilizes HHA or SPC during indoor ambulation, and SPC for outdoors.  also reports his best friend sometimes lends them a wheelchair for long distance/community ambulation. Pt presents today with post-traumatic pain, michael with weightbearing or ambulation. She required physical assistance with ADLs and functional txfs using FWW today. Pt will benefit from post acute placement prior to dc home. Will continue to follow for acute OT while in house as well.     Plan    Recommend Occupational Therapy 4 times per week until therapy goals are met for the following treatments:  Adaptive Equipment, Cognitive Skill Development, Community Re-integration, Neuro Re-Education / Balance, Self Care/Activities of Daily Living, Therapeutic Activities and Therapeutic Exercises.    DC Equipment Recommendations: Unable to determine at this time  Discharge Recommendations: Recommend post-acute placement for additional occupational therapy services prior to discharge home     Objective       06/22/21 1004   Prior Living Situation   Prior Services None   Housing / Facility 2 Story House   Steps Into Home 1   Steps In Home 20  (2 flights with landing; pt stays on main level now)   Rail Left Rail (Steps in Home)    Bathroom Set up   (sponge bathing only, as shower is located on 2nd floor)   Equipment Owned Front-Wheel Walker;Single Point Cane  ('s friend lends them a wheelchair on occasion)   Lives with - Patient's Self Care Capacity Spouse   Comments pt lives with supportive , who is her main caregiver. she has Dementia, and mainly using SPC outdoors, HHA by  in the home   Prior Level of ADL Function   Self Feeding Requires Assist  (setup)   Grooming / Hygiene Requires Assist  (setup/supervision)   Bathing Requires Assist   Dressing Requires Assist   Toileting Requires Assist   Prior Level of IADL Function   Medication Management Dependent   Laundry Dependent   Kitchen Mobility Dependent   Finances Dependent   Home Management Dependent   Shopping Dependent   Prior Level Of Mobility Supervision Without Device in Home;Supervision With Device in Community   Driving / Transportation Relatives / Others Provide Transportation   Occupation (Pre-Hospital Vocational) Retired Due To Age   History of Falls   History of Falls Yes   Date of Last Fall 06/21/21  (reason for admit)   Precautions   Precautions Fall Risk;Swallow Precautions ( See Comments)   Pain   Pain Scales 0 to 10 Scale    Pain 0 - 10 Group   Location Groin   Location Orientation Right   Pain Rating Scale (NPRS) 0   Description Aching   Comfort Goal Comfort with Movement   Therapist Pain Assessment 5;During Activity;Post Activity Pain Same as Prior to Activity   Non Verbal Descriptors   Non Verbal Scale  Calm;Unlabored Breathing   Cognition    Cognition / Consciousness X   Level of Consciousness Alert   Ability To Follow Commands 1 Step   New Learning Impaired   Comments oriented only to self   Passive ROM Upper Body   Passive ROM Upper Body WDL   Active ROM Upper Body   Active ROM Upper Body  WDL   Strength Upper Body   Upper Body Strength  WDL   Balance Assessment   Sitting Balance (Static) Fair +   Sitting Balance (Dynamic) Fair   Standing Balance  (Static) Fair -   Standing Balance (Dynamic) Poor   Weight Shift Sitting Fair   Weight Shift Standing Poor   Comments HHA initially, steadier with FWW   Bed Mobility    Supine to Sit Moderate Assist   Sit to Supine Moderate Assist   Scooting Moderate Assist   Rolling Minimal Assist to Rt.   Comments HOB slightly elevated, rails utilized   ADL Assessment   Grooming Seated;Minimal Assist   Upper Body Dressing Minimal Assist   Lower Body Dressing Maximal Assist   Toileting Minimal Assist  (pericare in standing)   How much help from another person does the patient currently need...   Putting on and taking off regular lower body clothing? 2   Bathing (including washing, rinsing, and drying)? 2   Toileting, which includes using a toilet, bedpan, or urinal? 3   Putting on and taking off regular upper body clothing? 3   Taking care of personal grooming such as brushing teeth? 3   Eating meals? 3   6 Clicks Daily Activity Score 16   Functional Mobility   Sit to Stand Minimal Assist   Bed, Chair, Wheelchair Transfer Minimal Assist   Toilet Transfers Minimal Assist   Transfer Method Stand Step   Mobility in room with FWW   Distance (Feet) 3   # of Times Distance was Traveled 2   Activity Tolerance   Sitting in Chair 5 mins BSC   Sitting Edge of Bed 5 mins total   Standing 1-2mins for txf   Comments limited by pain with weightbearing   Short Term Goals   Short Term Goal # 1 pt will complete G/H standing at sinkside x 10mins with spv   Short Term Goal # 2 pt will complete ADL txfs using LRAD at SBA/CGA level   Short Term Goal # 3 pt will complete FB dressing with SBA/min A   Education Group   Education Provided Role of Occupational Therapist;Home Safety;Transfers;Activities of Daily Living   Role of Occupational Therapist Patient Response Patient;Significant Other;Acceptance;Explanation   Home Safety Patient Response Patient;Significant Other;Acceptance;Explanation;Reinforcement Needed   Transfers Patient Response  Patient;Significant Other;Acceptance;Explanation;Reinforcement Needed   ADL Patient Response Patient;Significant Other;Acceptance;Explanation;Verbal Demonstration   Problem List   Problem List Decreased Active Daily Living Skills;Decreased Functional Mobility;Decreased Activity Tolerance;Safety Awareness Deficits / Cognition   Anticipated Discharge Equipment and Recommendations   DC Equipment Recommendations Unable to determine at this time   Discharge Recommendations Recommend post-acute placement for additional occupational therapy services prior to discharge home   Interdisciplinary Plan of Care Collaboration   IDT Collaboration with  Nursing;Family / Caregiver;Physician   Patient Position at End of Therapy In Bed;Bed Alarm On;Call Light within Reach;Tray Table within Reach;Family / Friend in Room   Collaboration Comments RN aware of OT session and dc recs;  present during entire OT session   Session Information   Date / Session Number  6/22 #1 (1/4, 6/28) LF   Priority 3

## 2021-06-22 NOTE — CARE PLAN
Problem: Pain - Standard  Goal: Alleviation of pain or a reduction in pain to the patient’s comfort goal  Outcome: Progressing     Problem: Fall Risk  Goal: Patient will remain free from falls  Outcome: Progressing     Problem: Knowledge Deficit - Standard  Goal: Patient and family/care givers will demonstrate understanding of plan of care, disease process/condition, diagnostic tests and medications  Outcome: Progressing     Problem: Skin Integrity  Goal: Skin integrity is maintained or improved  Outcome: Progressing     Problem: Respiratory  Goal: Patient will achieve/maintain optimum respiratory ventilation and gas exchange  Outcome: Progressing       The patient is Stable - Low risk of patient condition declining or worsening    Shift Goals  Clinical Goals: patient will remain comfortable and free from falls    Progress made toward(s) clinical / shift goals:  Pt resting in bed. Room kept dark to encourage rest. Bed alarm in place. Hourly rounding. Pt now on 1L. Encouraged pt to take deep breaths.    Patient is not progressing towards the following goals: n/a

## 2021-06-22 NOTE — CARE PLAN
The patient is Stable - Low risk of patient condition declining or worsening    Shift Goals  Clinical Goals: patient will remain free from falls.    Progress made toward(s) clinical / shift goals:  pt remained free from falls throughout shift. Fall precautions in place. Bed in lowest and locked position. Call light within reach. Bed alarm on.    Patient is not progressing towards the following goals:

## 2021-06-22 NOTE — ASSESSMENT & PLAN NOTE
Patient had a mechanical fall today after being bumped by the door.  Patient has an unsteady gait per the .  CT pelvis ordered  As needed pain meds  PT/OT ordered and recommending SNF placement, has been is agreeable, referral placed on 6/22.  Patient is medically cleared for discharge when accepted.

## 2021-06-22 NOTE — ASSESSMENT & PLAN NOTE
History of dementia.  Currently at baseline A&Ox1.  Lives with , discussed goals of care with .  Goal is to have patient return home for as long as possible.  She does need assistance and after recovery from current fall will likely be a candidate for home health.

## 2021-06-22 NOTE — PROGRESS NOTES
4 Eyes Skin Assessment Completed by MITCH Burns and MITCH Kirkpatrick.    Head WDL  Ears WDL  Nose WDL  Mouth WDL  Neck WDL  Breast/Chest WDL  Shoulder Blades WDL  Spine WDL  (R) Arm/Elbow/Hand WDL  (L) Arm/Elbow/Hand WDL  Abdomen WDL  Groin WDL  Scrotum/Coccyx/Buttocks WDL  (R) Leg WDL  (L) Leg WDL  (R) Heel/Foot/Toe WDL  (L) Heel/Foot/Toe WDL          Devices In Places Blood Pressure Cuff      Interventions In Place N/A    Possible Skin Injury No    Pictures Uploaded Into Epic N/A  Wound Consult Placed N/A  RN Wound Prevention Protocol Ordered No

## 2021-06-22 NOTE — ED NOTES
admission orders received , CT scan pending.  No iv to be placed per Dr Santos..  poc explained to pt and family

## 2021-06-22 NOTE — H&P
Hospital Medicine History & Physical Note    Date of Service  6/21/2021    Primary Care Physician  Daisy Espinoza M.D.    Consultants  none    Code Status  Prior    Chief Complaint  Chief Complaint   Patient presents with   • GLF     while in the bathroom cleaning herself up the  went to check on her and accedently nudged her with the door and she fell over    • Pelvic Pain     right side pain, pt states its not the hip. Hx of pelvix fracture        History of Presenting Illness  89 y.o. female who presented 6/21/2021 with a history of dementia, high cholesterol who presents after a ground-level fall complaining of right-sided pelvic pain.  Information obtained predominantly from the .  The patient reportedly has severe dementia and only knows her name.  At present she is at her baseline per the .   reports that the patient was cleaning the bathroom when he opened the door and accidentally bumped the patient and she subsequently fell and reported that it was painful in her pelvic region.  The  reports that the patient does have some difficulty walking around but after the fall today it is worse.  In the ED patient was noted to be hypoxic though was not complaining of shortness of breath, wheeze or cough.  The  reports that she has not shown any signs of shortness of breath and this is new to them.    Review of Systems  Review of Systems   Constitutional: Negative for chills and fever.   Respiratory: Negative for cough and shortness of breath.    Cardiovascular: Negative for chest pain and palpitations.   Gastrointestinal: Negative for abdominal pain, constipation and diarrhea.   Genitourinary: Negative for dysuria.   Musculoskeletal: Positive for falls and joint pain (right pelvis).   All other systems reviewed and are negative.      Past Medical History   has a past medical history of High cholesterol and Trigeminal neuralgia.    Surgical History   has a past surgical  history that includes gyn surgery and primary c section.     Family History  Parents had diabetes.  Brother had pancreatic cancer    Social History   reports that she has never smoked. She has never used smokeless tobacco. She reports that she does not drink alcohol and does not use drugs.    Allergies  No Known Allergies    Medications  Prior to Admission Medications   Prescriptions Last Dose Informant Patient Reported? Taking?   Calcium Carb-Cholecalciferol (OYSTER SHELL CALCIUM/VITAMIN D) 250-125 MG-UNIT Tab tablet NOT TAKING at NOT TAKING Family Member No No   Sig: Take 1 Tab by mouth every day.   Patient not taking: Reported on 6/21/2021   Cyanocobalamin (VITAMIN B-12 PO) FEW DAYS AGO at UNK Family Member Yes Yes   Sig: Take 1 tablet by mouth every day.   VITAMIN D PO FEW DAYS AGO at K Family Member Yes Yes   Sig: Take 1 tablet by mouth every day.   metFORMIN (GLUCOPHAGE) 500 MG Tab FEW DAYS AGO at UNK Family Member Yes Yes   Sig: Take 500 mg by mouth every day.   polyethylene glycol/lytes (MIRALAX) 17 g Pack NOT TAKING at NOT TAKING Family Member No No   Sig: Take 1 Packet by mouth 1 time daily as needed (Constipation).   Patient not taking: Reported on 6/21/2021      Facility-Administered Medications: None       Physical Exam  Temp:  [36.6 °C (97.8 °F)] 36.6 °C (97.8 °F)  Pulse:  [77-83] 77  Resp:  [16-31] 31  BP: (140)/(61) 140/61  SpO2:  [86 %-100 %] 100 %    Physical Exam  Vitals and nursing note reviewed.   Constitutional:       General: She is not in acute distress.     Appearance: Normal appearance. She is not ill-appearing or toxic-appearing.   HENT:      Head: Normocephalic and atraumatic.   Eyes:      General: No scleral icterus.        Right eye: No discharge.         Left eye: No discharge.   Cardiovascular:      Rate and Rhythm: Normal rate and regular rhythm.      Heart sounds: Murmur heard.     Pulmonary:      Effort: Pulmonary effort is normal. No respiratory distress.      Breath sounds: No  wheezing, rhonchi or rales.   Abdominal:      General: Abdomen is flat. Bowel sounds are normal. There is no distension.      Tenderness: There is no abdominal tenderness. There is no guarding.   Musculoskeletal:         General: Tenderness (right hip) present.      Right lower leg: No edema.      Left lower leg: No edema.   Skin:     General: Skin is warm and dry.      Coloration: Skin is not jaundiced.   Neurological:      Mental Status: She is alert.      Cranial Nerves: No cranial nerve deficit.      Comments: At baseline A&Ox1 to person   Psychiatric:         Behavior: Behavior is cooperative.         Cognition and Memory: Memory is impaired.         Laboratory:  Recent Labs     06/21/21  1759   WBC 12.0*   RBC 4.17*   HEMOGLOBIN 13.3   HEMATOCRIT 40.6   MCV 97.4   MCH 31.9   MCHC 32.8*   RDW 45.7   PLATELETCT 218   MPV 9.6         No results for input(s): ALTSGPT, ASTSGOT, ALKPHOSPHAT, TBILIRUBIN, DBILIRUBIN, GAMMAGT, AMYLASE, LIPASE, ALB, PREALBUMIN, GLUCOSE in the last 72 hours.      No results for input(s): NTPROBNP in the last 72 hours.      No results for input(s): TROPONINT in the last 72 hours.    Imaging:  DX-CHEST-PORTABLE (1 VIEW)   Final Result         Linear left basilar opacities, atelectasis versus infection.      DX-HIP-COMPLETE - UNILATERAL 2+ RIGHT   Final Result         Irregular lucencies seen in the right inferior pubic ramus, concerning for fracture.      CT-PELVIS W/O    (Results Pending)   EC-ECHOCARDIOGRAM COMPLETE W/O CONT    (Results Pending)         Assessment/Plan:  I anticipate this patient will require at least two midnights for appropriate medical management, necessitating inpatient admission.    * Acute respiratory failure with hypoxia (HCC)  Assessment & Plan  Hypoxic requiring 2L O2 baseline is room air.  Asymptomatic  CXR  Echo    Fall  Assessment & Plan  Patient had a mechanical fall today after being bumped by the door.  Patient has an unsteady gait per the .  CT  pelvis ordered  As needed pain meds  PT/OT ordered    Type 2 diabetes mellitus without complication, without long-term current use of insulin ([facility])  Assessment & Plan  Last a1c 7.1 On metformin at home.  Insulin sliding scale  Diabetic diet  Glucose checks before meals and at bedtime.    Dementia ([facility])- (present on admission)  Assessment & Plan  History of dementia.  Currently at baseline A&Ox1.  Lives with .

## 2021-06-22 NOTE — PROGRESS NOTES
Pt transferred from ED into [facility] at 1947. Pt only oriented to self. Pt stated she has no pain at this time. No medication intervention required. Pt  at bedside to provide admission information. Pt skin intact. 4L of O2 on with SpO2 at 99%. No other needs at this time. Bed in lowest position, call light in reach, bed alarm on.

## 2021-06-22 NOTE — PROGRESS NOTES
Received report from night shift RN. Pt is awake and resting in bed. Pt is AOx1. No signs of distress. Pt denies any nausea. Pt denies any numbness or tingling. Pt reports pain 0/10.  fall precautions in place. Bed in lowest and locked position. Call light within reach. Bed alarm on.

## 2021-06-22 NOTE — DISCHARGE PLANNING
Anticipated Discharge Disposition: SNF    Action:  Pt's flowsheet stating pt is Ox2. Pt also has dementia. LSW called pt's spouse, Scott ([phone]) to discuss SNF CHOICE. Scott stated that pt has been to [facility] in the past. Scott provided verbal consent for referrals to be placed to #1- [facility] #2- [facility] #3- [facility].     Pt discussed during IDT rounds. Per Dr. Baptiste pt cleared for transfer once SNF has accepted and bed is available.     Barriers to Discharge: None    Plan: Await SNF acceptance, LSW to continue to follow for d/c needs, LSW to assist as needed     Addendum [id number]  [facility]#- [id number]

## 2021-06-22 NOTE — THERAPY
"Speech Language Pathology   Initial Assessment     Patient Name: Daniel Aleman  AGE:  89 y.o., SEX:  female  Medical Record #: [id number]  Today's Date: 6/22/2021     Precautions  Precautions: Swallow Precautions ( See Comments)    Assessment  Patient is 89 y.o. female admitted 6/21 with GLF, pelvic pain. PMHx: dementia, high cholesterol, type 2 diabetes. Pt reportedly has dementia and only knows her name. Per notes,  reports that the pt was cleaning up in the bathroom when he opened the door and accidentally bumped the patient and she subsequently fell. Pt noted to be hypoxic in ED however was asymptomatic. CT pelvis ordered. Chest x-ray on 6/21 reports: linear left basilar opacities, atelectasis verses infection.     Clinical swallow evaluation completed this date. Pt's , Stew, at bedside. Per , pt has difficulty with water and coughs often, reports she doesn't eat much however maintains her weight. He further reports that d/t poor dentition, she has a difficult time with mastication. Pt was agreeable to PO, oriented to self, location (\"at a hospital\"), and year. Oral mechanism exam completed with no gross deficits appreciated. She is mostly edentulous. PO of MTL and liquidzed textures resulted in complete hyolaryngeal elevation. She did require cueing in order to consistently execute a swallow prior to taking additional bites/sips. Purees resulted in delayed initiation of swallow trigger with pt reporting some difficulty, stating, \"it's thicker\" compared to liquidized textures. She also had some delayed coughing with purees.Trials of thins resulted in minimal audible gulping. At this time, pt appears appropriate to initiate a modified diet of LQ3/MT2 with direct 1:1 supervision/assistance from nursing staff during meals and strict adherence to posted swallow precautions. Please cue pt to swallow after EACH bite/sip, as she does not do this with consistency independently. Please hold PO with any " difficulty, increasing upper airway congestion, or s/sx concerning for aspiration. Provided education to pt and  regarding recommendations, with both verbalizing good understanding. SLP to follow.     Plan  1. LQ3/MT2 with DIRECT 1:1 supervision/assistance  2. Please cue pt to swallow after EACH bite/sip    Recommend Speech Therapy 3 times per week until therapy goals are met for the following treatments:  Dysphagia Training.    Discharge Recommendations: Recommend home health for continued speech therapy services    Objective     06/22/21 0940   Precautions   Precautions Swallow Precautions ( See Comments)   Prior Level Of Function   Communication Impaired  (Dementia, oriented to self at baseline)   Swallow Impaired  (Impaired, per )   Dentition Poor Quality    Oral Motor Eval    Is Patient Able to Complete Oral Motor Eval Yes, Within Normal Limits   Laryngeal Function   Voice Quality Within Functional Limits   Volutional Cough Within Functional Limits   Excursion Upon Swallow Complete   Oral Food Presentation   Single Swallow Mildly Thick (2) - (Nectar Thick)  Within Functional Limits   Serial Swallow Mildly Thick (2) - (Nectar Thick) Within Functional Limits   Single Swallow Thin (0) Minimal   Liquidised (3) Within Functional Limits   Pureed (4) Minimal   Self Feeding Needs Assistance   Dysphagia Strategies / Recommendations   Strategies / Interventions Recommended (Yes / No) Yes   Compensatory Strategies Strict 1:1 Feeding;Head of Bed 90 Degrees During Eating / Drinking;Single Sips / Bites   Diet / Liquid Recommendation Liquidised (3) - (Nectar Thick Full Liquid);Mildly Thick (2) - (Nectar Thick)   Medication Administration  Other (See Comments)  (Crush with applesauce)   Follow Up SLP Evaluation SLP to follow   Short Term Goals   Short Term Goal # 1 Pt will consume LQ3/MT2 diet with direct 1:1 supervision/assistance from nursing staff and strict adherence to posted swallow precautions with no  overt s/sx concerning for aspiration.

## 2021-06-22 NOTE — ASSESSMENT & PLAN NOTE
Last a1c 7.1 On metformin at home.  Insulin sliding scale  Diabetic diet  Glucose checks before meals and at bedtime.

## 2021-06-22 NOTE — DISCHARGE PLANNING
Received Choice form at 1357  Agency/Facility Name: [facility], [place].  Referral sent per Choice form @ 5810

## 2021-06-23 ENCOUNTER — PATIENT OUTREACH (OUTPATIENT)
Dept: HEALTH INFORMATION MANAGEMENT | Facility: OTHER | Age: 86
End: 2021-06-23

## 2021-06-23 VITALS
HEIGHT: 60 IN | DIASTOLIC BLOOD PRESSURE: 61 MMHG | SYSTOLIC BLOOD PRESSURE: 156 MMHG | HEART RATE: 63 BPM | TEMPERATURE: 98.1 F | OXYGEN SATURATION: 99 % | RESPIRATION RATE: 16 BRPM | BODY MASS INDEX: 21.64 KG/M2 | WEIGHT: 110.23 LBS

## 2021-06-23 PROBLEM — J96.01 ACUTE RESPIRATORY FAILURE WITH HYPOXIA (HCC): Status: RESOLVED | Noted: 2021-06-21 | Resolved: 2021-06-23

## 2021-06-23 PROCEDURE — 700111 HCHG RX REV CODE 636 W/ 250 OVERRIDE (IP): Performed by: INTERNAL MEDICINE

## 2021-06-23 PROCEDURE — 92526 ORAL FUNCTION THERAPY: CPT

## 2021-06-23 PROCEDURE — A9270 NON-COVERED ITEM OR SERVICE: HCPCS | Performed by: INTERNAL MEDICINE

## 2021-06-23 PROCEDURE — 99239 HOSP IP/OBS DSCHRG MGMT >30: CPT | Performed by: INTERNAL MEDICINE

## 2021-06-23 PROCEDURE — 700102 HCHG RX REV CODE 250 W/ 637 OVERRIDE(OP): Performed by: INTERNAL MEDICINE

## 2021-06-23 RX ADMIN — DOCUSATE SODIUM 50 MG AND SENNOSIDES 8.6 MG 2 TABLET: 8.6; 5 TABLET, FILM COATED ORAL at 05:43

## 2021-06-23 RX ADMIN — ENOXAPARIN SODIUM 40 MG: 40 INJECTION SUBCUTANEOUS at 05:44

## 2021-06-23 ASSESSMENT — FIBROSIS 4 INDEX: FIB4 SCORE: 1.91

## 2021-06-23 NOTE — THERAPY
Speech Language Pathology  Daily Treatment     Patient Name: Daniel Aleman  Age:  89 y.o., Sex:  female  Medical Record #: [id number]  Today's Date: 6/23/2021     Precautions: Fall Risk, Swallow Precautions (See Comments)    Assessment    Pt was seen today for f/u dysphagia tx and therapeutic feeding session with LQ3/MT2 meal (liquidised solids, mildly thick liquids.) Per RN, Pt with improved self-initiation of swallow per bolus without input/reminders. Pt independently held cups, bowls and utensils to feed herself this session. She consumed liquidised sweet potatoes, silk (almond milk, thins), and peaches (provided by SLP.) Pt consumed all PO with no subtle nor overt clinical s/sx of aspiration/penetration. Pt with slightly prolonged mastication, likely due to reduced dentition, but had no oral residue and/or c/o globus sensation.     At this time, recommend PO diet upgrade to minced/moist solids with thin liquids and meds crushed in puree; please monitor with meals and hold PO if any difficulties/concerns or change in status.    Plan    Continue current treatment plan.    Discharge Recommendations: Recommend post-acute placement for additional speech therapy services prior to discharge home     Objective     06/23/21 1202   Cognitive-Linguistic   Level of Consciousness Alert   Dysphagia    Positioning / Behavior Modification Self Monitoring   Other Treatments Lunch tray of LQ3/MT2, trials thins and peaches   Diet / Liquid Recommendation Minced & Moist (5) - (Dysphagia II);Thin (0)   Nutritional Liquid Intake Rating Scale Non thickened beverages   Nutritional Food Intake Rating Scale Total oral diet with multiple consistencies but requiring special preparation or compensations   Nursing Communication Swallow Precaution Sign Posted at Head of Bed   Skilled Intervention Compensatory Strategies;Verbal Cueing   Recommended Route of Medication Administration   Medication Administration  Crush all Medications in Puree    Short Term Goals   Short Term Goal # 1 B  Pt will consume PO diet of MM5/TN0 with no clinical s/sx of aspiration/penetration.   Education Group   Education Provided Dysphagia   Dysphagia Patient Response Patient;Acceptance;Explanation;Demonstration;Verbal Demonstration;Action Demonstration;Reinforcement Needed

## 2021-06-23 NOTE — PROGRESS NOTES
Assumed care of pt at 1915. Received report from Kaya MEYER. Pt resting in bed. Pt oriented to self only. Pt reminded to keep oxygen in place. Pt 97% SpO2 on 3L O2.. Pt stated her pain is a 0 on 0 to 10 scale. Pt encouraged to void in the bedside commode. No other needs at this time, call light in place, bed in lowest position, bed alarm on.

## 2021-06-23 NOTE — PROGRESS NOTES
Received report from night shift RN. Pt is awake and alert. No signs of distress. Pt denies any nausea. Pt denies any numbness or tingling. Pt denies any pain.  fall precautions in place. Bed in lowest and locked position. Call light within reach. Bed alarm on.

## 2021-06-23 NOTE — CARE PLAN
Problem: Pain - Standard  Goal: Alleviation of pain or a reduction in pain to the patient’s comfort goal  Outcome: Progressing     Problem: Fall Risk  Goal: Patient will remain free from falls  Outcome: Progressing     Problem: Knowledge Deficit - Standard  Goal: Patient and family/care givers will demonstrate understanding of plan of care, disease process/condition, diagnostic tests and medications  Outcome: Progressing     Problem: Skin Integrity  Goal: Skin integrity is maintained or improved  Outcome: Progressing     Problem: Respiratory  Goal: Patient will achieve/maintain optimum respiratory ventilation and gas exchange  Outcome: Progressing       The patient is Stable - Low risk of patient condition declining or worsening    Shift Goals  Clinical Goals: patient will rest and have adequate oxygenation  Patient Goals: patient will rest    Progress made toward(s) clinical / shift goals:  Pt resting throughout the night. Pt able to be weaned to 2L O2. Room kept dark in order to encourag rest.    Patient is not progressing towards the following goals: n/a

## 2021-06-23 NOTE — DISCHARGE INSTRUCTIONS
Understanding Your Risk for Falls  Each year, millions of people suffer serious injuries from falls. It is important to understand your risk for falling. Talk with your health care provider about your risk and what you can do to lower it. There are actions you can take at home to lower your risk.  If you do have a serious fall, it is important to tell your health care provider. Falling once raises your risk for falling again.  How can falls affect me?  Serious injuries from falls are common. These include:  · Broken bones. Most hip fractures are caused by falls.  · Traumatic brain injury (TBI). Falls are the most common cause of TBI.  Fear of falling can also cause you to avoid activities and stay at home. This can make your muscles weaker and actually raise your risk for a fall.  What can increase my risk?  Serious injuries from a fall most often happen to people older than age 65. Children and young adults ages 15-29 are also at higher risk. The more risk factors you have for falling, the higher your risk. Risk factors include:  · Weakness in the lower body.  · Lack (deficiency) of vitamin D.  · Weak bones (osteoporosis).  · Being generally weak or confused due to long-term (chronic) illness.  · Dizziness or balance problems.  · Poor vision.  · Having depression.  · Medicine that causes dizziness or drowsiness. These can include medicines for your blood pressure, heart, anxiety, insomnia, or edema, as well as pain medicines and muscle relaxants.  · Drinking alcohol.  · Foot pain or improper footwear.  · Working at a dangerous job.  · Having had a fall in the past.  · Tripping hazards at home, such as floor clutter or loose rugs, or poor lighting.  · Having pets or clutter in your home.  What actions can I take to lower my risk of falling?         · Maintain physical fitness:  ? Do strength and balance exercises. Consider taking a regular class to build strength and balance. Yoga and gerson chi are good  options.  ? Have your eyes checked every year and your vision prescription updated as needed.  · Remove all clutter from walkways and stairways, including extension cords.  · Use a cordless phone.  · Do not use throw rugs. Make sure all carpeting is taped or tacked down securely.  · Use good lighting in all rooms. Keep a flashlight near your bed.  · Make sure there is a clear path from your bed to the bathroom. Use night-lights.  · Install grab bars for your tub, shower, and toilet. Use a bath mat in your tub or shower.  · Attach secure railings on both sides of your stairs.  · Repair uneven or broken steps.  · Use a cane or walker as directed by your health care provider.  · Wear nonskid shoes. Do not wear high heels. Do not walk around the house in socks or slippers.  · Avoid walking on icy or slippery surfaces. Walk on the grass instead of on icy or slick sidewalks. Where you can, use ice melt to get rid of ice on walkways.  Questions to ask your health care provider  · Can you help me evaluate my risk for a fall?  · Do any of my medicines make me more likely to fall?  · Should I take a vitamin D supplement?  · What exercises can I do to improve my strength and balance?  · Should I make an appointment to have my vision checked?  · Do I need a bone density test to check for osteoporosis?  · Would it help to use a cane or a walker?  Where to find more information  · Centers for Disease Control and Prevention, STEADI: cdc.gov  · Community-Based Fall Prevention Programs: cdc.gov  · National [place] [employer] Aging: bd8gity.charlene.nih.gov  Contact a health care provider if:  · You fall at home.  · You are afraid of falling at home.  · You feel weak, drowsy, or dizzy at home.  Summary  · People 65 and older are at high risk for falling. However, older people are not the only ones injured in falls. Children and young adults have a higher-than-normal risk, too.  · Talk with your health care provider about your risks for falling  and how to lower those risks.  · Taking certain precautions at home can lower your risk for falling.  · If you fall, always tell your health care provider.  This information is not intended to replace advice given to you by your health care provider. Make sure you discuss any questions you have with your health care provider.  Document Released: 08/01/2018 Document Revised: 03/19/2019 Document Reviewed: 08/01/2018  [employer] Patient Education © 2020 [employer]      Discharge Instructions    Discharged to other by medical transportation with escort. Discharged via transport, hospital escort: Yes.  Special equipment needed: Not Applicable    Be sure to schedule a follow-up appointment with your primary care doctor or any specialists as instructed.     Discharge Plan:        I understand that a diet low in cholesterol, fat, and sodium is recommended for good health. Unless I have been given specific instructions below for another diet, I accept this instruction as my diet prescription.   Other diet:Diet Order Diet: Level 5 - Minced and Moist; Liquid level: Level 0 - Thin; Second Modifier: (optional): Consistent CHO (Diabetic); Tray Modifications (optional): SLP - 1:1 Supervision by Nursing    Special Instructions: None    · Is patient discharged on Warfarin / Coumadin?   No     Depression / Suicide Risk    As you are discharged from this [facility] facility, it is important to learn how to keep safe from harming yourself.    Recognize the warning signs:  · Abrupt changes in personality, positive or negative- including increase in energy   · Giving away possessions  · Change in eating patterns- significant weight changes-  positive or negative  · Change in sleeping patterns- unable to sleep or sleeping all the time   · Unwillingness or inability to communicate  · Depression  · Unusual sadness, discouragement and loneliness  · Talk of wanting to die  · Neglect of personal appearance   · Rebelliousness- reckless  behavior  · Withdrawal from people/activities they love  · Confusion- inability to concentrate     If you or a loved one observes any of these behaviors or has concerns about self-harm, here's what you can do:  · Talk about it- your feelings and reasons for harming yourself  · Remove any means that you might use to hurt yourself (examples: pills, rope, extension cords, firearm)  · Get professional help from the community (Mental Health, Substance Abuse, psychological counseling)  · Do not be alone:Call your Safe Contact- someone whom you trust who will be there for you.  · Call your local CRISIS HOTLINE 568-2996 or 797-841-1725  · Call your local Children's Mobile Crisis Response Team [place] (612) 826-2354 or www.[employer] / cTrader  · Call the toll free National Suicide Prevention Hotlines   · National Suicide Prevention Lifeline 107-960-ZDJF (7117)  · National Hope Line Network 800-SUICIDE (572-7374)

## 2021-06-23 NOTE — DISCHARGE PLANNING
Anticipated Discharge Disposition: SNF- [facility]    Action: Epic showing that pt has been accepted by [facility]. LSW messaged Francheska [facility] and requested f/u with bed availability.   Per bedside Kaya MEYER yesterday pt appropriate for wheelchair transport.     LSW to collect signature for COBRA from Dr. Baptiste during morning rounds.     Barriers to Discharge: SNF bed availability    Plan: Await SNF bed availability, LSW to continue to assist with d/c needs    Addendum 0979  LSW called [facility] [phone] and spoke to [facility]. Kim requested LSW's call back number to call back with an update.   LSW to await update on bed availability.     Addendum 1005  LSW received a call from [facility] and was informed that a bed is available today. No SNF transport available today. Requested for LSW to coordinate. Requesting transport for 1400.     LSW to coordinate transport.   LSW messaged Dr. Baptiste and provided update.     Addendum 1012  LSW able to get approved service signed by supervisor, Yanci.   LSW faxed facesheet, transport form, and approved service to [facility]   fax- [phone]    LSW messaged Britney with [facility] to inform.     Addendum 1106  Note placed by Britney in Epic with transport time. LSW did not receive a message notifying LSW of confirmed transport time.    LSW confirmed with Britney that transport time confirmed for 1400 via [facility] to [facility] today.   LSW messaged bedside RNKaya to ensure she is aware of transport time.     LSW called Kim with [facility] and provided update.     Addendum 1126  LSW called pt's spouse, Amy to provide update.   Verbal collected for COBRA and IMM.     Addendum 1129  LSW faxed IMM to Francheska RAZO.    Addendum 1300  LSW messaged Dr. Baptiste requesting anticipated time for d/c summary completion. Now available.   LSW able to complete transfer packet. LSW to place in pt's chart.

## 2021-06-23 NOTE — CARE PLAN
The patient is Stable - Low risk of patient condition declining or worsening    Shift Goals  Clinical Goals: pt will remain free from falls, have adequate oxygnenation  Patient Goals: patient will rest    Progress made toward(s) clinical / shift goals:  pt remained free from falls throughout shift. Pt needing 2L of O2 for adequate oxygenation.    Patient is not progressing towards the following goals:

## 2021-06-23 NOTE — DISCHARGE SUMMARY
"Discharge Summary    CHIEF COMPLAINT ON ADMISSION  Chief Complaint   Patient presents with   • GLF     while in the bathroom cleaning herself up the  went to check on her and accedently nudged her with the door and she fell over    • Pelvic Pain     right side pain, pt states its not the hip. Hx of pelvix fracture        Reason for Admission  fell less than 5 feet, R hip pain     Admission Date  6/21/2021    CODE STATUS  DNAR/DNI    HPI & HOSPITAL COURSE    Per notes, \"89 y.o. female who presented 6/21/2021 with a history of dementia, high cholesterol who presents after a ground-level fall complaining of right-sided pelvic pain.  Information obtained predominantly from the .  The patient reportedly has severe dementia and only knows her name.  At present she is at her baseline per the .   reports that the patient was cleaning the bathroom when he opened the door and accidentally bumped the patient and she subsequently fell and reported that it was painful in her pelvic region.  The  reports that the patient does have some difficulty walking around but after the fall today it is worse.  In the ED patient was noted to be hypoxic though was not complaining of shortness of breath, wheeze or cough.  The  reports that she has not shown any signs of shortness of breath and this is new to them.\"    Patient was admitted and monitored overnight for weakness, right-sided pelvic pain.  She is evaluated by PT/OT who recommended skilled nursing placement.  She was accepted by [facility] skilled nursing and will be discharged for further recovery.     Therefore, she is discharged in good and stable condition to skilled nursing facility.    The patient met 2-midnight criteria for an inpatient stay at the time of discharge.    Discharge Date  6/23/2021    FOLLOW UP ITEMS POST DISCHARGE  Follow-up with PCP    DISCHARGE DIAGNOSES  Principal Problem (Resolved):    Acute respiratory failure with " hypoxia (HCC) POA: Unknown  Active Problems:    Dementia (HCC) POA: Yes    Fall POA: Unknown    Type 2 diabetes mellitus without complication, without long-term current use of insulin (HCC) POA: Unknown    Advanced directives, counseling/discussion POA: Yes      FOLLOW UP  No future appointments.  Daisy Espinoza M.D.  [address] [facility]  [place] [id number] [phone]    Call  Please call to schedule a hospital follow up appointment with your Primary Care Provider, as needed. Thank you.      MEDICATIONS ON DISCHARGE     Medication List      CONTINUE taking these medications      Instructions   metFORMIN 500 MG Tabs  Commonly known as: GLUCOPHAGE   Take 500 mg by mouth every day.  Dose: 500 mg     VITAMIN B-12 PO   Take 1 tablet by mouth every day.  Dose: 1 tablet     VITAMIN D PO   Take 1 tablet by mouth every day.  Dose: 1 tablet        STOP taking these medications    oyster shell calcium/vitamin D 250-125 MG-UNIT Tabs tablet     polyethylene glycol/lytes 17 g Pack  Commonly known as: MIRALAX            Allergies  No Known Allergies    DIET  Orders Placed This Encounter   Procedures   • Diet Order Diet: Level 5 - Minced and Moist; Liquid level: Level 0 - Thin; Second Modifier: (optional): Consistent CHO (Diabetic); Tray Modifications (optional): SLP - 1:1 Supervision by Nursing, SLP - Deliver to Nursing Station     Standing Status:   Standing     Number of Occurrences:   1     Order Specific Question:   Diet:     Answer:   Level 5 - Minced and Moist [24]     Order Specific Question:   Liquid level     Answer:   Level 0 - Thin     Order Specific Question:   Second Modifier: (optional)     Answer:   Consistent CHO (Diabetic) [4]     Order Specific Question:   Tray Modifications (optional)     Answer:   SLP - 1:1 Supervision by Nursing     Order Specific Question:   Tray Modifications (optional)     Answer:   SLP - Deliver to Nursing Station       ACTIVITY  As tolerated.  Weight bearing as  tolerated    CONSULTATIONS  None    PROCEDURES  None    LABORATORY  Lab Results   Component Value Date    SODIUM 140 06/21/2021    POTASSIUM 3.7 06/21/2021    CHLORIDE 104 06/21/2021    CO2 25 06/21/2021    GLUCOSE 199 (H) 06/21/2021    BUN 17 06/21/2021    CREATININE 0.63 06/21/2021        Lab Results   Component Value Date    WBC 12.0 (H) 06/21/2021    HEMOGLOBIN 13.3 06/21/2021    HEMATOCRIT 40.6 06/21/2021    PLATELETCT 218 06/21/2021        Total time of the discharge process exceeds 35 minutes.

## 2021-06-23 NOTE — DISCHARGE PLANNING
Received Transport Form @ 1014  Spoke to GMT @ 1021  Transport is scheduled for 6/23 @ 1400 going to [facility].

## 2021-06-23 NOTE — PROGRESS NOTES
"Hospital Medicine Daily Progress Note    Date of Service  6/22/2021    Chief Complaint  89 y.o. female admitted 6/21/2021 with status post fall, right-sided pelvic pain    Hospital Course  Per notes, \"89 y.o. female who presented 6/21/2021 with a history of dementia, high cholesterol who presents after a ground-level fall complaining of right-sided pelvic pain.  Information obtained predominantly from the .  The patient reportedly has severe dementia and only knows her name.  At present she is at her baseline per the .   reports that the patient was cleaning the bathroom when he opened the door and accidentally bumped the patient and she subsequently fell and reported that it was painful in her pelvic region.  The  reports that the patient does have some difficulty walking around but after the fall today it is worse.  In the ED patient was noted to be hypoxic though was not complaining of shortness of breath, wheeze or cough.  The  reports that she has not shown any signs of shortness of breath and this is new to them.\"      Interval Problem Update  Seen and examined this morning at bedside, currently at mental status baseline, still very weak.  PT/OT recommending SNF.    Discussed with  at bedside.  I did also confirm patient's CODE STATUS with patient's , which is DNR/DNI.    Consultants/Specialty  None    Code Status  DNAR/DNI    Disposition  Discussed discharge to staff    Review of Systems  Review of Systems   Unable to perform ROS: Dementia        Physical Exam  Temp:  [36.4 °C (97.5 °F)-37.2 °C (99 °F)] 37.1 °C (98.7 °F)  Pulse:  [60-88] 70  Resp:  [18-20] 18  BP: (138-163)/(51-58) 148/51  SpO2:  [92 %-99 %] 95 %    Physical Exam  Vitals and nursing note reviewed.   Constitutional:       Appearance: Normal appearance. She is not ill-appearing.   Cardiovascular:      Rate and Rhythm: Normal rate and regular rhythm.      Pulses: Normal pulses.      Heart sounds: " Normal heart sounds.   Pulmonary:      Effort: Pulmonary effort is normal.      Breath sounds: Normal breath sounds.   Abdominal:      General: Abdomen is flat. Bowel sounds are normal.      Palpations: Abdomen is soft.   Musculoskeletal:      Right lower leg: No edema.      Left lower leg: No edema.   Neurological:      General: No focal deficit present.      Mental Status: Mental status is at baseline.         Fluids    Intake/Output Summary (Last 24 hours) at 6/22/2021 1713  Last data filed at 6/22/2021 1505  Gross per 24 hour   Intake --   Output 100 ml   Net -100 ml       Laboratory  Recent Labs     06/21/21  1759   WBC 12.0*   RBC 4.17*   HEMOGLOBIN 13.3   HEMATOCRIT 40.6   MCV 97.4   MCH 31.9   MCHC 32.8*   RDW 45.7   PLATELETCT 218   MPV 9.6     Recent Labs     06/21/21  1759   SODIUM 140   POTASSIUM 3.7   CHLORIDE 104   CO2 25   GLUCOSE 199*   BUN 17   CREATININE 0.63   CALCIUM 8.6                   Imaging  EC-ECHOCARDIOGRAM COMPLETE W/O CONT         CT-PELVIS W/O   Final Result      1.  No hip or pelvic fracture.   2.  Mild degenerative change of both hips.   3.  Bilateral adnexal cystic lesions, larger and more complex on the RIGHT.  RIGHT ovarian lesion is unchanged from prior, likely dermoid.      DX-CHEST-PORTABLE (1 VIEW)   Final Result         Linear left basilar opacities, atelectasis versus infection.      DX-HIP-COMPLETE - UNILATERAL 2+ RIGHT   Final Result         Irregular lucencies seen in the right inferior pubic ramus, concerning for fracture.           Assessment/Plan  * Acute respiratory failure with hypoxia (HCC)  Assessment & Plan  Hypoxic requiring 2L O2 baseline is room air, possibly contributing to falls.  Unclear if this is acute on chronic.  Wean off O2 as tolerated  Follow-up on echocardiogram results    Advanced directives, counseling/discussion- (present on admission)  Assessment & Plan  Discuss goals of care and advanced directives with patient's .  Wishes for patient to  be DNR/DNI    Type 2 diabetes mellitus without complication, without long-term current use of insulin (HCC)  Assessment & Plan  Last a1c 7.1 On metformin at home.  Insulin sliding scale  Diabetic diet  Glucose checks before meals and at bedtime.    Fall  Assessment & Plan  Patient had a mechanical fall today after being bumped by the door.  Patient has an unsteady gait per the .  CT pelvis ordered  As needed pain meds  PT/OT ordered and recommending SNF placement, has been is agreeable, referral placed on 6/22.  Patient is medically cleared for discharge when accepted.    Dementia (HCC)- (present on admission)  Assessment & Plan  History of dementia.  Currently at baseline A&Ox1.  Lives with , discussed goals of care with .  Goal is to have patient return home for as long as possible.  She does need assistance and after recovery from current fall will likely be a candidate for home health.         VTE prophylaxis: SCDs

## 2021-07-23 ENCOUNTER — TELEPHONE (OUTPATIENT)
Dept: HEALTH INFORMATION MANAGEMENT | Facility: OTHER | Age: 86
End: 2021-07-23

## 2021-07-23 NOTE — TELEPHONE ENCOUNTER
PC to Scott Aleman to discuss discharge planning needs and referral to [facility].    Per [facility], discharge of Daniel Aleman is planned for 7/27. An overview of the services provided by [facility] an [facility]  was given; Mr. Aleman is agreeable to both services at the time of His wife's discharge from [facility].    TCN contact information was provided.

## 2021-07-26 ENCOUNTER — HOME HEALTH ADMISSION (OUTPATIENT)
Dept: HOME HEALTH SERVICES | Facility: HOME HEALTHCARE | Age: 86
End: 2021-07-26
Payer: MEDICARE

## 2021-07-29 ENCOUNTER — HOME CARE VISIT (OUTPATIENT)
Dept: HOME HEALTH SERVICES | Facility: HOME HEALTHCARE | Age: 86
End: 2021-07-29
Payer: MEDICARE

## 2021-07-29 ENCOUNTER — DOCUMENTATION (OUTPATIENT)
Dept: MEDICAL GROUP | Facility: PHYSICIAN GROUP | Age: 86
End: 2021-07-29

## 2021-07-29 VITALS
DIASTOLIC BLOOD PRESSURE: 70 MMHG | HEART RATE: 88 BPM | OXYGEN SATURATION: 96 % | RESPIRATION RATE: 16 BRPM | WEIGHT: 110 LBS | BODY MASS INDEX: 21.6 KG/M2 | SYSTOLIC BLOOD PRESSURE: 120 MMHG | TEMPERATURE: 98 F | HEIGHT: 60 IN

## 2021-07-29 PROCEDURE — 665001 SOC-HOME HEALTH

## 2021-07-29 PROCEDURE — G0493 RN CARE EA 15 MIN HH/HOSPICE: HCPCS

## 2021-07-29 ASSESSMENT — PAIN SCALES - PAIN ASSESSMENT IN ADVANCED DEMENTIA (PAINAD)
FACIALEXPRESSION: 0 - SMILING OR INEXPRESSIVE.
NEGVOCALIZATION: 0 - NONE.
BODYLANGUAGE: 0 - RELAXED.
TOTALSCORE: 0
CONSOLABILITY: 0 - NO NEED TO CONSOLE.

## 2021-07-29 ASSESSMENT — ENCOUNTER SYMPTOMS
PAIN LOCATION - PAIN FREQUENCY: INFREQUENT
PAIN LOCATION: GENERALIZED
LOWEST PAIN SEVERITY IN PAST 24 HOURS: 0/10
DIFFICULTY THINKING: 1
PERSON REPORTING PAIN: FAMILY
PAIN: 1
HIGHEST PAIN SEVERITY IN PAST 24 HOURS: 5/10
SUBJECTIVE PAIN PROGRESSION: WAXING AND WANING
VOMITING: DENIES
PAIN LOCATION - PAIN QUALITY: DULL ACHE
POOR JUDGMENT: 1
PAIN SEVERITY GOAL: 0/10
NAUSEA: DENIES
PAIN LOCATION - PAIN SEVERITY: 5/10

## 2021-07-29 ASSESSMENT — FIBROSIS 4 INDEX: FIB4 SCORE: 1.91

## 2021-07-29 ASSESSMENT — ACTIVITIES OF DAILY LIVING (ADL): OASIS_M1830: 06

## 2021-07-29 NOTE — CASE COMMUNICATION
"Primary dx/Skilled need:Discharged from [facility] post 30 day in house stay. GLF on 6/23/21 with transfer to [facility]. Dementia-unspecified has exacerbated over a 2 year period and is now dependent -all aspects of care (primary Cgr. is  who lacks knowledge re., care. Incontinence of B&B. Amb. instability-muscle weakness/degeneration. History NIDDM, ARF  Skilled care for Instruction re., Care of the dependent pt. Function and safety in the home (PT,OT) disease processes-dementia  SN frequency.1wk1, 2wk4,1wk4  Zip code.[id number]  Disciplines ordered.SN, PT, OT, HHA, MSW  Insurance and authorization.[facility]  Certification period.7/29/21 to 9/26/21  Special considerations. Stew- pt's  also \"looks after their son who has dementia\" Stew can not leave pt. with Asif to watch Daniel when he goes grocery shopping"

## 2021-07-29 NOTE — PROGRESS NOTES
Medication chart review for [facility] services    PCP:  Daisy Espinoza M.D.  [address] [place] [address]  Fax: [phone]    Current medication list     Current Outpatient Medications:   •  dronabinol, 5 mg, Oral, DAILY AT 1800  •  traMADol HCl (ULTRAM PO), 50 mg, Oral, 4X/DAY PRN  •  Acetaminophen (TYLENOL PO), 500 mg, Oral, BID PRN  •  metFORMIN, 500 mg, Oral, DAILY    No Known Allergies    Labs     Lab Results   Component Value Date/Time    HBA1C 7.1 (H) 11/17/2020 10:53 AM          Lab Results   Component Value Date/Time    CHOLSTRLTOT 185 05/29/2020 09:19 AM     (H) 05/29/2020 09:19 AM    HDL 48 05/29/2020 09:19 AM    TRIGLYCERIDE 100 05/29/2020 09:19 AM       Lab Results   Component Value Date/Time    SODIUM 140 06/21/2021 05:59 PM    POTASSIUM 3.7 06/21/2021 05:59 PM    CHLORIDE 104 06/21/2021 05:59 PM    CO2 25 06/21/2021 05:59 PM    GLUCOSE 199 (H) 06/21/2021 05:59 PM    BUN 17 06/21/2021 05:59 PM    CREATININE 0.63 06/21/2021 05:59 PM     Lab Results   Component Value Date/Time    ALKPHOSPHAT 96 06/21/2021 05:59 PM    ASTSGOT 14 06/21/2021 05:59 PM    ALTSGPT 10 06/21/2021 05:59 PM    TBILIRUBIN 0.4 06/21/2021 05:59 PM    INR 1.02 08/07/2009 07:55 AM    ALBUMIN 3.6 06/21/2021 05:59 PM      Lab Results   Component Value Date/Time    WBC 12.0 (H) 06/21/2021 05:59 PM    RBC 4.17 (L) 06/21/2021 05:59 PM    HEMOGLOBIN 13.3 06/21/2021 05:59 PM    HEMATOCRIT 40.6 06/21/2021 05:59 PM    PLATELETCT 218 06/21/2021 05:59 PM      Lab Results   Component Value Date/Time    MALBCRT see below 09/25/2018 08:37 AM    MICROALBUR <0.7 09/25/2018 08:37 AM     Immunization History   Administered Date(s) Administered   • Influenza Vaccine Adult HD 09/27/2014, 10/06/2015, 09/05/2017   • Moderna SARS-CoV-2 Vaccine 02/05/2021, 03/04/2021       Assessment and Plan:   • Received referral from [facility]. Medications reviewed.         Ajay Bueno, PharmD, MS, BCACP, LCC  [facility]  Phone [phone] fax [phone]    This note was created using voice recognition software (Dragon). The accuracy of the dictation is limited by the abilities of the software. I have reviewed the note prior to signing, however some errors in grammar and context are still possible. If you have any questions related to this note please do not hesitate to contact our office.

## 2021-07-29 NOTE — Clinical Note
"  ----- Message -----  From: Lianet King R.N.  Sent: 7/29/2021  11:34 AM PDT  To: LINDSAY Quezada      Primary dx/Skilled need:Discharged from [facility] post 30 day in house stay. GLF on 6/23/21 with transfer to [facility]. Dementia-unspecified has exacerbated over a 2 year period and is now dependent -all aspects of care (primary Cgr. is  who lacks knowledge re., care. Incontinence of B&B. Amb. instability-muscle weakness/degeneration. History NIDDM, ARF  Skilled care for Instruction re., Care of the dependent pt. Function and safety in the home (PT,OT) disease processes-dementia  SN frequency.1wk1, 2wk4,1wk4  Zip code.[id number]  Disciplines ordered.SN, PT, OT, HHA, MSW  Insurance and authorization.[facility]  Certification period.7/29/21 to 9/26/21  Special considerations. Stew- pt's  also \"looks after their son who has dementia\" Stew can not leave pt. with Asif to watch Daniel when he goes grocery shopping"

## 2021-07-30 ENCOUNTER — HOME CARE VISIT (OUTPATIENT)
Dept: HOME HEALTH SERVICES | Facility: HOME HEALTHCARE | Age: 86
End: 2021-07-30
Payer: MEDICARE

## 2021-07-30 PROBLEM — R63.0 DECREASED APPETITE: Status: ACTIVE | Noted: 2021-07-30

## 2021-07-30 NOTE — CASE COMMUNICATION
"noted  ----- Message -----  From: Lianet King R.N.  Sent: 7/29/2021  11:34 AM PDT  To: Portia Baptiste R.N.      Primary dx/Skilled need:Discharged from [facility] post 30 day in house stay. GLF on 6/23/21 with transfer to [facility]. Dementia-unspecified has exacerbated over a 2 year period and is now dependent -all aspects of care (primary Cgr. is  who lacks knowledge re., care. Incontinence of B&B. Amb. instability-muscle weakness/degeneration. History NIDDM, ARF  Skilled care for Instruction re., Care of the dependent pt. Function and safety in the home (PT,OT) disease processes-dementia  SN frequency.1wk1, 2wk4,1wk4  Zip code.[id number]  Disciplines ordered.SN, PT, OT, HHA, MSW  Insurance and authorization.[facility]  Certification period.7/29/21 to 9/26/21  Special considerations. Stew- pt's  also \"looks after their son who has dementia\" Stew can not leave pt. with Asif to watch Daniel when he goes grocery shopping"

## 2021-08-02 ENCOUNTER — HOME CARE VISIT (OUTPATIENT)
Dept: HOME HEALTH SERVICES | Facility: HOME HEALTHCARE | Age: 86
End: 2021-08-02
Payer: MEDICARE

## 2021-08-02 VITALS
RESPIRATION RATE: 16 BRPM | SYSTOLIC BLOOD PRESSURE: 118 MMHG | DIASTOLIC BLOOD PRESSURE: 70 MMHG | TEMPERATURE: 97.5 F | OXYGEN SATURATION: 98 % | HEART RATE: 71 BPM

## 2021-08-02 PROCEDURE — G0151 HHCP-SERV OF PT,EA 15 MIN: HCPCS

## 2021-08-02 ASSESSMENT — ENCOUNTER SYMPTOMS
PAIN: DENIES PAIN DURING THIS VISIT
DENIES PAIN: 1
MUSCLE WEAKNESS: 1
DIFFICULTY THINKING: 1

## 2021-08-02 ASSESSMENT — PAIN SCALES - PAIN ASSESSMENT IN ADVANCED DEMENTIA (PAINAD)
FACIALEXPRESSION: 0 - SMILING OR INEXPRESSIVE.
TOTALSCORE: 0
CONSOLABILITY: 0 - NO NEED TO CONSOLE.
BODYLANGUAGE: 0 - RELAXED.
NEGVOCALIZATION: 0 - NONE.

## 2021-08-02 ASSESSMENT — ACTIVITIES OF DAILY LIVING (ADL)
AMBULATION ASSISTANCE: ONE PERSON
CURRENT_FUNCTION: ONE PERSON
AMBULATION ASSISTANCE ON FLAT SURFACES: 1

## 2021-08-02 NOTE — Clinical Note
PT evaluation completed, requesting follow up visits with frequency of 1w3 effective as of 08/02/2021.

## 2021-08-03 ENCOUNTER — HOME CARE VISIT (OUTPATIENT)
Dept: HOME HEALTH SERVICES | Facility: HOME HEALTHCARE | Age: 86
End: 2021-08-03
Payer: MEDICARE

## 2021-08-03 VITALS
TEMPERATURE: 97.8 F | SYSTOLIC BLOOD PRESSURE: 132 MMHG | RESPIRATION RATE: 18 BRPM | HEART RATE: 67 BPM | OXYGEN SATURATION: 92 % | DIASTOLIC BLOOD PRESSURE: 60 MMHG

## 2021-08-03 PROCEDURE — G0495 RN CARE TRAIN/EDU IN HH: HCPCS

## 2021-08-03 ASSESSMENT — ENCOUNTER SYMPTOMS
DIFFICULTY THINKING: 1
MUSCLE WEAKNESS: 1
POOR JUDGMENT: 1

## 2021-08-03 ASSESSMENT — ACTIVITIES OF DAILY LIVING (ADL)
AMBULATION ASSISTANCE: ONE PERSON
CURRENT_FUNCTION: ONE PERSON

## 2021-08-03 ASSESSMENT — PAIN SCALES - PAIN ASSESSMENT IN ADVANCED DEMENTIA (PAINAD)
BODYLANGUAGE: 0 - RELAXED.
FACIALEXPRESSION: 0 - SMILING OR INEXPRESSIVE.
TOTALSCORE: 1
CONSOLABILITY: 0 - NO NEED TO CONSOLE.
NEGVOCALIZATION: 1 - OCCASIONAL MOAN OR GROAN. LOW-LEVEL SPEECH WITH A NEGATIVE OR DISAPPROVING QUALITY.

## 2021-08-03 NOTE — CASE COMMUNICATION
noted  ----- Message -----  From: Donato Guzman, PT  Sent: 8/2/2021   9:42 PM PDT  To: Portia Baptiste R.N.      PT evaluation completed, requesting follow up visits with frequency of 1w3 effective as of 08/02/2021.

## 2021-08-04 ENCOUNTER — HOME CARE VISIT (OUTPATIENT)
Dept: HOME HEALTH SERVICES | Facility: HOME HEALTHCARE | Age: 86
End: 2021-08-04
Payer: MEDICARE

## 2021-08-04 PROCEDURE — G0155 HHCP-SVS OF CSW,EA 15 MIN: HCPCS

## 2021-08-04 ASSESSMENT — ENCOUNTER SYMPTOMS: POOR JUDGMENT: 1

## 2021-08-04 NOTE — CASE COMMUNICATION
Quality Review for 7/29/21 SOC OASIS by ROBSON Robison RN on  August 3, 2021    Edits completed by ROBSON Robison RN:  1.  is 7/27/21, date of valid referral  2.  is 1 per functional status of lacking endurance  3. Completed F2F information

## 2021-08-05 ENCOUNTER — HOME CARE VISIT (OUTPATIENT)
Dept: HOME HEALTH SERVICES | Facility: HOME HEALTHCARE | Age: 86
End: 2021-08-05
Payer: MEDICARE

## 2021-08-05 VITALS
DIASTOLIC BLOOD PRESSURE: 80 MMHG | HEART RATE: 66 BPM | OXYGEN SATURATION: 98 % | RESPIRATION RATE: 17 BRPM | TEMPERATURE: 97.5 F | SYSTOLIC BLOOD PRESSURE: 122 MMHG

## 2021-08-05 PROCEDURE — G0156 HHCP-SVS OF AIDE,EA 15 MIN: HCPCS

## 2021-08-05 ASSESSMENT — ENCOUNTER SYMPTOMS
PERSON REPORTING PAIN: PATIENT
DENIES PAIN: 1
PAIN SEVERITY GOAL: 0/10
HIGHEST PAIN SEVERITY IN PAST 24 HOURS: 0/10

## 2021-08-05 NOTE — CASE COMMUNICATION
I agree with these changes  ----- Message -----  From: Ninoska Robison R.N.  Sent: 8/3/2021   5:43 PM PDT  To: Lianet King R.N.      Quality Review for 7/29/21 SOC OASIS by ROBSON Robison, MITCH on  August 3, 2021    Edits completed by ROBSON Robison RN:  1.  is 7/27/21, date of valid referral  2.  is 1 per functional status of lacking endurance  3. Completed F2F information

## 2021-08-06 ENCOUNTER — HOME CARE VISIT (OUTPATIENT)
Dept: HOME HEALTH SERVICES | Facility: HOME HEALTHCARE | Age: 86
End: 2021-08-06
Payer: MEDICARE

## 2021-08-06 PROCEDURE — G0495 RN CARE TRAIN/EDU IN HH: HCPCS

## 2021-08-06 NOTE — Clinical Note
pt sitted in chair when sn came, alert, oriented x2, knows name ,mamie was able to give her birthday with ;s assistance. pt said no pain. pt answering some questions at times but would look at  if she needs help with some assessment /questions.  said pt is sharp today. medications no change as reported by . states he was seen by RADHA Hanson PA-C from [facility] yesterday.   who is the primary caregiver reported that his son who stays with has early symptoms of dementio also MSW also came with community referral for assistance as reproted.. diabetic foot check done, no sore, oepn wounds, redness, teaching on diabetic foot care done.  instructed to UTI prevention and s/s  to report, as well as safety/fall precautions. . Pt/Cg response to the services provided:  voiced understanding to teachings , pt just smiled when asked if she understood teachings.

## 2021-08-08 VITALS
OXYGEN SATURATION: 94 % | RESPIRATION RATE: 16 BRPM | SYSTOLIC BLOOD PRESSURE: 132 MMHG | TEMPERATURE: 97.7 F | HEART RATE: 70 BPM | DIASTOLIC BLOOD PRESSURE: 76 MMHG

## 2021-08-08 ASSESSMENT — ENCOUNTER SYMPTOMS
POOR JUDGMENT: 1
MUSCLE WEAKNESS: 1
DENIES PAIN: 1
DIFFICULTY THINKING: 1
NAUSEA: DENIES
VOMITING: DENIES
LIMITED RANGE OF MOTION: 1

## 2021-08-08 ASSESSMENT — ACTIVITIES OF DAILY LIVING (ADL)
AMBULATION ASSISTANCE: STAND BY ASSIST
CURRENT_FUNCTION: STAND BY ASSIST

## 2021-08-08 ASSESSMENT — PAIN SCALES - PAIN ASSESSMENT IN ADVANCED DEMENTIA (PAINAD)
CONSOLABILITY: 0 - NO NEED TO CONSOLE.
FACIALEXPRESSION: 0 - SMILING OR INEXPRESSIVE.
BODYLANGUAGE: 0 - RELAXED.

## 2021-08-09 ENCOUNTER — HOME CARE VISIT (OUTPATIENT)
Dept: HOME HEALTH SERVICES | Facility: HOME HEALTHCARE | Age: 86
End: 2021-08-09
Payer: MEDICARE

## 2021-08-09 VITALS
HEART RATE: 77 BPM | TEMPERATURE: 97.7 F | RESPIRATION RATE: 16 BRPM | SYSTOLIC BLOOD PRESSURE: 132 MMHG | DIASTOLIC BLOOD PRESSURE: 72 MMHG | OXYGEN SATURATION: 96 %

## 2021-08-09 VITALS
SYSTOLIC BLOOD PRESSURE: 132 MMHG | DIASTOLIC BLOOD PRESSURE: 72 MMHG | TEMPERATURE: 97.7 F | RESPIRATION RATE: 16 BRPM | OXYGEN SATURATION: 96 % | HEART RATE: 77 BPM

## 2021-08-09 PROCEDURE — G0152 HHCP-SERV OF OT,EA 15 MIN: HCPCS

## 2021-08-09 PROCEDURE — G0156 HHCP-SVS OF AIDE,EA 15 MIN: HCPCS

## 2021-08-09 ASSESSMENT — ACTIVITIES OF DAILY LIVING (ADL)
SPONGING_UB_CURRENT_FUNCTION: ONE PERSON
CUTTING_FOOD_ONE_PERSON: 1
SPONGING_LB_CURRENT_FUNCTION: DEPENDENT
HAIR_CARE_ONE_PERSON: 1
CUTTING_FOOD_CURRENT_FUNCTION_DEPENDENT: 1
SPONGING_LB_CURRENT_FUNCTION: ONE PERSON
ORAL_CARE_ONE_PERSON: 1
CURRENT_FUNCTION: MINIMUM ASSIST
PHYSICAL TRANSFERS ASSESSED: 1
HAIR_CARE_DEPENDENT: 1
DRESSING_LB_CURRENT_FUNCTION: CONTACT GUARD ASSIST
BATHING_CURRENT_FUNCTION: ONE PERSON
TOILETING: MODERATE ASSIST
FEEDING ASSESSED: 1
HAIR_CARE_ASSESSED: 1
BATHING ASSESSED: 1
FEEDING: CONTACT GUARD ASSIST
ORAL_CARE_CURRENT_FUNCTION_DEPENDENT: 1
TOILETING: 1
USING_UTENSILS_SUPERVISION: 1
GROOMING_CURRENT_FUNCTION: MODERATE ASSIST
GROOMING ASSESSED: 1
SPONGING_UB_CURRENT_FUNCTION: DEPENDENT
AMBULATION ASSISTANCE: 1
AMBULATION ASSISTANCE: MINIMUM ASSIST
BATHING_CURRENT_FUNCTION: MAXIMUM ASSIST
DRESSING_UB_CURRENT_FUNCTION: STAND BY ASSIST

## 2021-08-09 ASSESSMENT — ENCOUNTER SYMPTOMS
DENIES PAIN: 1
DENIES PAIN: 1

## 2021-08-09 NOTE — CASE COMMUNICATION
noted  ----- Message -----  From: Katie Stout R.N.  Sent: 8/8/2021  10:56 AM PDT  To: Portia Baptiste R.N.      pt sitted in chair when sn came, alert, oriented x2, knows name ,mamie was able to give her birthday with ;s assistance. pt said no pain. pt answering some questions at times but would look at  if she needs help with some assessment /questions.  said pt is sharp today. medications no change as reported by . states he was seen by RADHA Hanson PA-C from [facility] yesterday.   who is the primary caregiver reported that his son who stays with has early symptoms of dementio also MSW also came with community referral for assistance as reproted.. diabetic foot check done, no sore, oepn wounds, redness, teaching on diabetic foot care done.  instructed to UTI prevention and s/s  to report, as well as safety/fall precautions. . Pt/Cg response to the services provided:  voiced understanding to teachings , pt just smiled when asked if she understood teachings.

## 2021-08-10 ENCOUNTER — HOME CARE VISIT (OUTPATIENT)
Dept: HOME HEALTH SERVICES | Facility: HOME HEALTHCARE | Age: 86
End: 2021-08-10
Payer: MEDICARE

## 2021-08-10 VITALS
SYSTOLIC BLOOD PRESSURE: 120 MMHG | OXYGEN SATURATION: 91 % | TEMPERATURE: 98.7 F | RESPIRATION RATE: 16 BRPM | DIASTOLIC BLOOD PRESSURE: 60 MMHG | HEART RATE: 80 BPM

## 2021-08-10 PROCEDURE — G0151 HHCP-SERV OF PT,EA 15 MIN: HCPCS

## 2021-08-10 PROCEDURE — G0495 RN CARE TRAIN/EDU IN HH: HCPCS

## 2021-08-10 ASSESSMENT — ACTIVITIES OF DAILY LIVING (ADL)
AMBULATION ASSISTANCE ON FLAT SURFACES: 1
AMBULATION_DISTANCE/DURATION_TOLERATED: <50FT ONE WAY

## 2021-08-10 ASSESSMENT — PAIN SCALES - PAIN ASSESSMENT IN ADVANCED DEMENTIA (PAINAD)
TOTALSCORE: 0
NEGVOCALIZATION: 0 - NONE.
FACIALEXPRESSION: 0 - SMILING OR INEXPRESSIVE.
BODYLANGUAGE: 0 - RELAXED.
CONSOLABILITY: 0 - NO NEED TO CONSOLE.

## 2021-08-10 ASSESSMENT — ENCOUNTER SYMPTOMS
DENIES PAIN: 1
PAIN: DENIES PAIN DURING THIS VISIT
LIMITED RANGE OF MOTION: 1
MUSCLE WEAKNESS: 1
POOR JUDGMENT: 1
DIFFICULTY THINKING: 1
POOR JUDGMENT: 1
DENIES PAIN: 1
DIFFICULTY THINKING: 1

## 2021-08-10 NOTE — CASE COMMUNICATION
noted  ----- Message -----  From: Telma Parada, OT  Sent: 8/9/2021   4:43 PM PDT  To: Portia Baptiste R.N.      OT will be evaluation only.  , Norbert, agrees there is no further need for OT at this time.

## 2021-08-10 NOTE — CASE COMMUNICATION
noted  ----- Message -----  From: SUKHDEV TurciosNEDIS  Sent: 8/9/2021   7:09 PM PDT  To: JAIME Brown     Patient  stated his wife only needs her hair  to be washed once a week.

## 2021-08-11 ENCOUNTER — HOME CARE VISIT (OUTPATIENT)
Dept: HOME HEALTH SERVICES | Facility: HOME HEALTHCARE | Age: 86
End: 2021-08-11
Payer: MEDICARE

## 2021-08-11 PROCEDURE — G0155 HHCP-SVS OF CSW,EA 15 MIN: HCPCS

## 2021-08-11 NOTE — Clinical Note
On call RN received a call from pt spouse Scott at 1815 d/t pt having severe pain d/t constipation. He claims he has been giving pt stool softeners with plenty of water. Encouraged prune juice, he said if does not work will take pt to ED.

## 2021-08-12 ENCOUNTER — HOME CARE VISIT (OUTPATIENT)
Dept: HOME HEALTH SERVICES | Facility: HOME HEALTHCARE | Age: 86
End: 2021-08-12
Payer: MEDICARE

## 2021-08-12 PROCEDURE — G0156 HHCP-SVS OF AIDE,EA 15 MIN: HCPCS

## 2021-08-12 ASSESSMENT — ENCOUNTER SYMPTOMS
HIGHEST PAIN SEVERITY IN PAST 24 HOURS: 0/10
DIFFICULTY THINKING: 1

## 2021-08-13 ENCOUNTER — HOME CARE VISIT (OUTPATIENT)
Dept: HOME HEALTH SERVICES | Facility: HOME HEALTHCARE | Age: 86
End: 2021-08-13
Payer: MEDICARE

## 2021-08-13 PROCEDURE — G0495 RN CARE TRAIN/EDU IN HH: HCPCS

## 2021-08-13 NOTE — Clinical Note
Sera joya fax to Dr VIC Baugh and Valentin Flynn P.A.-C.     pt sitted in bedisde commode trying to have bowel movement when sn came. pt is alert ,smiles only when asked quesions. . reported pt had small bowel mivement last wednesday. he also reported that he had given a teasepoon of minral oil last night and one teasepoon again earlier. abdomen soft normoactive bowel sounds, denies  n/v or abdominal pain. pt was seen by SARITHA MO from [facility] yesterday. pt doesnt have mirtazapine ,  said he went to [facility] already but med not available. this sn  called [facility] pharmacy and was told they have not recieved medication form other pharmacy yet . this sn also called [facility] and left message, re pt not getting mirtazaoine.  reported pt not drinking eanough water . instructed on adequate fluid intake to prevent constipation. dehydration and UTI.  reported that he started giving prune juice yesterday. states he weill call meals on wheels. pt had muffins, banana and yougurt eearlier. and usually have tv dinners, pt had good soft brownish  bowel  movement before the nurse left.asked if she feels better after bowel movement, pt nodded head.

## 2021-08-15 VITALS
TEMPERATURE: 97.4 F | RESPIRATION RATE: 16 BRPM | DIASTOLIC BLOOD PRESSURE: 60 MMHG | SYSTOLIC BLOOD PRESSURE: 126 MMHG | OXYGEN SATURATION: 94 % | HEART RATE: 76 BPM

## 2021-08-15 ASSESSMENT — ENCOUNTER SYMPTOMS
LIMITED RANGE OF MOTION: 1
VOMITING: DENIES
DIFFICULTY THINKING: 1
DENIES PAIN: 1
POOR JUDGMENT: 1
NAUSEA: DENIES
MUSCLE WEAKNESS: 1
PERSON REPORTING PAIN: PATIENT

## 2021-08-15 ASSESSMENT — PAIN SCALES - PAIN ASSESSMENT IN ADVANCED DEMENTIA (PAINAD)
FACIALEXPRESSION: 0 - SMILING OR INEXPRESSIVE.
BODYLANGUAGE: 0 - RELAXED.
NEGVOCALIZATION: 0 - NONE.
CONSOLABILITY: 0 - NO NEED TO CONSOLE.
TOTALSCORE: 0

## 2021-08-15 ASSESSMENT — ACTIVITIES OF DAILY LIVING (ADL)
AMBULATION ASSISTANCE: ONE PERSON
CURRENT_FUNCTION: ONE PERSON

## 2021-08-16 NOTE — CASE COMMUNICATION
noted  ----- Message -----  From: Emeka Jordan R.N.  Sent: 8/11/2021   9:15 PM PDT  To: Portia Baptiste R.N.      On call RN received a call from pt spouse Scott at 1815 d/t pt having severe pain d/t constipation. He claims he has been giving pt stool softeners with plenty of water. Encouraged prune juice, he said if does not work will take pt to ED.

## 2021-08-16 NOTE — CASE COMMUNICATION
noted  ----- Message -----  From: Katie Stout R.N.  Sent: 8/15/2021  10:00 PM PDT  To: [facility] fax to Dr VIC Baugh and Valentin Flynn P.A.-C.     pt sitted in bedisde commode trying to have bowel movement when sn came. pt is alert ,smiles only when asked quesions. . reported pt had small bowel mivement last wednesday. he also reported that he had given a teasepoon of minral oil last night and one teasepoon again earlier. abdomen soft normoactive bowel sounds, denies  n/v or abdominal pain. pt was seen by SARITHA MO from [facility] yesterday. pt doesnt have mirtazapine ,  said he went to [facility] already but med not available. this sn  called [facility] pharmacy and was told they have not recieved medication form other pharmacy yet . this sn also called [facility] and left message, re pt not getting mirtazaoine.  reported pt not drinking eanough water . instructed on adequate fluid intake to prevent constipation. dehydration and UTI.  reported that he started giving prune juice yesterday. states he weill call meals on wheels. pt had muffins, banana and yougurt eearlier. and usually have tv dinners, pt had good soft brownish  bowel  movement before the nurse left.asked if she feels better after bowel movement, pt nodded head.

## 2021-08-17 ENCOUNTER — HOME CARE VISIT (OUTPATIENT)
Dept: HOME HEALTH SERVICES | Facility: HOME HEALTHCARE | Age: 86
End: 2021-08-17
Payer: MEDICARE

## 2021-08-17 VITALS
DIASTOLIC BLOOD PRESSURE: 70 MMHG | TEMPERATURE: 98.2 F | HEART RATE: 72 BPM | OXYGEN SATURATION: 97 % | SYSTOLIC BLOOD PRESSURE: 122 MMHG | RESPIRATION RATE: 18 BRPM

## 2021-08-17 PROCEDURE — G0299 HHS/HOSPICE OF RN EA 15 MIN: HCPCS

## 2021-08-17 PROCEDURE — G0156 HHCP-SVS OF AIDE,EA 15 MIN: HCPCS

## 2021-08-17 ASSESSMENT — ENCOUNTER SYMPTOMS
PERSON REPORTING PAIN: PATIENT
MUSCLE WEAKNESS: 1
DENIES PAIN: 1
DIFFICULTY THINKING: 1

## 2021-08-17 ASSESSMENT — PAIN SCALES - PAIN ASSESSMENT IN ADVANCED DEMENTIA (PAINAD)
BODYLANGUAGE: 0 - RELAXED.
TOTALSCORE: 0
CONSOLABILITY: 0 - NO NEED TO CONSOLE.
FACIALEXPRESSION: 0 - SMILING OR INEXPRESSIVE.
NEGVOCALIZATION: 0 - NONE.

## 2021-08-18 ENCOUNTER — HOME CARE VISIT (OUTPATIENT)
Dept: HOME HEALTH SERVICES | Facility: HOME HEALTHCARE | Age: 86
End: 2021-08-18
Payer: MEDICARE

## 2021-08-18 ENCOUNTER — HOSPITAL ENCOUNTER (OUTPATIENT)
Facility: MEDICAL CENTER | Age: 86
End: 2021-08-18
Attending: PHYSICIAN ASSISTANT
Payer: MEDICARE

## 2021-08-18 VITALS
OXYGEN SATURATION: 96 % | HEART RATE: 68 BPM | RESPIRATION RATE: 16 BRPM | DIASTOLIC BLOOD PRESSURE: 70 MMHG | TEMPERATURE: 97.8 F | SYSTOLIC BLOOD PRESSURE: 110 MMHG

## 2021-08-18 DIAGNOSIS — R63.0 DECREASED APPETITE: ICD-10-CM

## 2021-08-18 DIAGNOSIS — E11.9 TYPE 2 DIABETES MELLITUS WITHOUT COMPLICATION, WITHOUT LONG-TERM CURRENT USE OF INSULIN (HCC): ICD-10-CM

## 2021-08-18 LAB
ALBUMIN SERPL BCP-MCNC: 3.5 G/DL (ref 3.2–4.9)
ALBUMIN/GLOB SERPL: 1 G/DL
ALP SERPL-CCNC: 138 U/L (ref 30–99)
ALT SERPL-CCNC: 11 U/L (ref 2–50)
ANION GAP SERPL CALC-SCNC: 12 MMOL/L (ref 7–16)
AST SERPL-CCNC: 22 U/L (ref 12–45)
BASOPHILS # BLD AUTO: 0.8 % (ref 0–1.8)
BASOPHILS # BLD: 0.05 K/UL (ref 0–0.12)
BILIRUB SERPL-MCNC: 0.2 MG/DL (ref 0.1–1.5)
BUN SERPL-MCNC: 20 MG/DL (ref 8–22)
CALCIUM SERPL-MCNC: 9.4 MG/DL (ref 8.5–10.5)
CHLORIDE SERPL-SCNC: 104 MMOL/L (ref 96–112)
CO2 SERPL-SCNC: 25 MMOL/L (ref 20–33)
CREAT SERPL-MCNC: 0.52 MG/DL (ref 0.5–1.4)
EOSINOPHIL # BLD AUTO: 0.19 K/UL (ref 0–0.51)
EOSINOPHIL NFR BLD: 3 % (ref 0–6.9)
ERYTHROCYTE [DISTWIDTH] IN BLOOD BY AUTOMATED COUNT: 52.9 FL (ref 35.9–50)
EST. AVERAGE GLUCOSE BLD GHB EST-MCNC: 140 MG/DL
GLOBULIN SER CALC-MCNC: 3.4 G/DL (ref 1.9–3.5)
GLUCOSE SERPL-MCNC: 192 MG/DL (ref 65–99)
HBA1C MFR BLD: 6.5 % (ref 4–5.6)
HCT VFR BLD AUTO: 45.1 % (ref 37–47)
HGB BLD-MCNC: 14.7 G/DL (ref 12–16)
IMM GRANULOCYTES # BLD AUTO: 0.05 K/UL (ref 0–0.11)
IMM GRANULOCYTES NFR BLD AUTO: 0.8 % (ref 0–0.9)
LYMPHOCYTES # BLD AUTO: 1.51 K/UL (ref 1–4.8)
LYMPHOCYTES NFR BLD: 23.6 % (ref 22–41)
MCH RBC QN AUTO: 32 PG (ref 27–33)
MCHC RBC AUTO-ENTMCNC: 32.6 G/DL (ref 33.6–35)
MCV RBC AUTO: 98 FL (ref 81.4–97.8)
MONOCYTES # BLD AUTO: 0.58 K/UL (ref 0–0.85)
MONOCYTES NFR BLD AUTO: 9.1 % (ref 0–13.4)
NEUTROPHILS # BLD AUTO: 4.02 K/UL (ref 2–7.15)
NEUTROPHILS NFR BLD: 62.7 % (ref 44–72)
NRBC # BLD AUTO: 0 K/UL
NRBC BLD-RTO: 0 /100 WBC
PLATELET # BLD AUTO: 267 K/UL (ref 164–446)
PMV BLD AUTO: 10.4 FL (ref 9–12.9)
POTASSIUM SERPL-SCNC: 4.3 MMOL/L (ref 3.6–5.5)
PROT SERPL-MCNC: 6.9 G/DL (ref 6–8.2)
RBC # BLD AUTO: 4.6 M/UL (ref 4.2–5.4)
SODIUM SERPL-SCNC: 141 MMOL/L (ref 135–145)
WBC # BLD AUTO: 6.4 K/UL (ref 4.8–10.8)

## 2021-08-18 PROCEDURE — G0151 HHCP-SERV OF PT,EA 15 MIN: HCPCS

## 2021-08-18 PROCEDURE — 83036 HEMOGLOBIN GLYCOSYLATED A1C: CPT

## 2021-08-18 PROCEDURE — 36415 COLL VENOUS BLD VENIPUNCTURE: CPT

## 2021-08-18 PROCEDURE — 85025 COMPLETE CBC W/AUTO DIFF WBC: CPT

## 2021-08-18 PROCEDURE — G0155 HHCP-SVS OF CSW,EA 15 MIN: HCPCS

## 2021-08-18 PROCEDURE — 80053 COMPREHEN METABOLIC PANEL: CPT

## 2021-08-18 ASSESSMENT — PAIN SCALES - PAIN ASSESSMENT IN ADVANCED DEMENTIA (PAINAD)
TOTALSCORE: 0
FACIALEXPRESSION: 0 - SMILING OR INEXPRESSIVE.
BODYLANGUAGE: 0 - RELAXED.
NEGVOCALIZATION: 0 - NONE.
CONSOLABILITY: 0 - NO NEED TO CONSOLE.

## 2021-08-18 ASSESSMENT — ENCOUNTER SYMPTOMS
POOR JUDGMENT: 1
DIFFICULTY THINKING: 1
PAIN: DENIES PAIN DURING THIS VISIT
DIFFICULTY THINKING: 1
DENIES PAIN: 1

## 2021-08-18 NOTE — Clinical Note
Please request 1 PRN visit to assist caregiver in bedrail installation and home modifications as needed.

## 2021-08-20 ENCOUNTER — HOME CARE VISIT (OUTPATIENT)
Dept: HOME HEALTH SERVICES | Facility: HOME HEALTHCARE | Age: 86
End: 2021-08-20
Payer: MEDICARE

## 2021-08-20 PROCEDURE — G0151 HHCP-SERV OF PT,EA 15 MIN: HCPCS

## 2021-08-20 NOTE — CASE COMMUNICATION
noted  ----- Message -----  From: Donato Guzman, PT  Sent: 8/18/2021   9:36 PM PDT  To: Portia Baptiste R.N.      Patient discharged from home health PT effective 08/18/2021 with all goals met.

## 2021-08-20 NOTE — Clinical Note
"   declined SNV, pt was just seen by [facility] Sendy Hanson PA-C yesterday. States he has to bring son to the doctor , doesn\"t know what time they will be back in the afternoon and that.pt  is doing fine, "

## 2021-08-20 NOTE — CASE COMMUNICATION
noted  ----- Message -----  From: Donato Guzman, PT  Sent: 8/19/2021  11:10 PM PDT  To: Portia Baptiste R.N.      Please request 1 PRN visit to assist caregiver in bedrail installation and home modifications as needed.

## 2021-08-21 PROBLEM — R13.19 OTHER DYSPHAGIA: Status: ACTIVE | Noted: 2021-08-21

## 2021-08-21 PROBLEM — R09.89 BILATERAL RALES: Status: ACTIVE | Noted: 2021-08-21

## 2021-08-21 NOTE — CASE COMMUNICATION
"noted  ----- Message -----  From: Katie Stout R.N.  Sent: 8/20/2021   4:17 PM PDT  To: Portia Baptiste R.N.         declined SNV, pt was just seen by [facility] Sendy Hanson PA-C yesterday. States he has to bring son to the doctor , doesn\"t know what time they will be back in the afternoon and that.pt  is doing fine, "

## 2021-08-22 VITALS
HEART RATE: 72 BPM | DIASTOLIC BLOOD PRESSURE: 68 MMHG | RESPIRATION RATE: 18 BRPM | SYSTOLIC BLOOD PRESSURE: 118 MMHG | TEMPERATURE: 97.7 F | OXYGEN SATURATION: 96 %

## 2021-08-22 ASSESSMENT — ENCOUNTER SYMPTOMS
POOR JUDGMENT: 1
DENIES PAIN: 1
PAIN: DENIES PAIN DURING THIS VISIT
DIFFICULTY THINKING: 1

## 2021-08-23 ENCOUNTER — HOME CARE VISIT (OUTPATIENT)
Dept: HOME HEALTH SERVICES | Facility: HOME HEALTHCARE | Age: 86
End: 2021-08-23
Payer: MEDICARE

## 2021-08-23 VITALS
DIASTOLIC BLOOD PRESSURE: 70 MMHG | OXYGEN SATURATION: 99 % | RESPIRATION RATE: 20 BRPM | SYSTOLIC BLOOD PRESSURE: 120 MMHG | HEART RATE: 60 BPM | TEMPERATURE: 96.6 F

## 2021-08-23 PROCEDURE — G0156 HHCP-SVS OF AIDE,EA 15 MIN: HCPCS

## 2021-08-24 ENCOUNTER — HOME CARE VISIT (OUTPATIENT)
Dept: HOME HEALTH SERVICES | Facility: HOME HEALTHCARE | Age: 86
End: 2021-08-24
Payer: MEDICARE

## 2021-08-24 VITALS
RESPIRATION RATE: 20 BRPM | SYSTOLIC BLOOD PRESSURE: 120 MMHG | OXYGEN SATURATION: 93 % | HEART RATE: 82 BPM | TEMPERATURE: 98.2 F | DIASTOLIC BLOOD PRESSURE: 70 MMHG

## 2021-08-24 PROBLEM — I77.9 DISORDER OF ARTERIES AND ARTERIOLES, UNSPECIFIED (HCC): Status: ACTIVE | Noted: 2021-08-24

## 2021-08-24 PROCEDURE — G0495 RN CARE TRAIN/EDU IN HH: HCPCS

## 2021-08-24 ASSESSMENT — ENCOUNTER SYMPTOMS
DIFFICULTY THINKING: 1
MUSCLE WEAKNESS: 1
DENIES PAIN: 1
POOR JUDGMENT: 1

## 2021-08-24 ASSESSMENT — ACTIVITIES OF DAILY LIVING (ADL)
CURRENT_FUNCTION: ONE PERSON
AMBULATION ASSISTANCE: ONE PERSON

## 2021-08-25 ENCOUNTER — HOME CARE VISIT (OUTPATIENT)
Dept: HOME HEALTH SERVICES | Facility: HOME HEALTHCARE | Age: 86
End: 2021-08-25
Payer: MEDICARE

## 2021-08-25 PROBLEM — R26.9 ABNORMAL GAIT: Status: ACTIVE | Noted: 2021-08-25

## 2021-08-25 PROBLEM — D68.9 DISORDER OF HEMOSTASIS (HCC): Status: RESOLVED | Noted: 2021-08-25 | Resolved: 2021-08-25

## 2021-08-25 PROBLEM — D70.0 CONGENITAL NEUTROPENIA (HCC): Status: RESOLVED | Noted: 2021-08-25 | Resolved: 2021-08-25

## 2021-08-25 PROBLEM — E43 SEVERE PROTEIN-CALORIE MALNUTRITION (HCC): Status: ACTIVE | Noted: 2021-08-25

## 2021-08-25 PROBLEM — D53.9 MACROCYTIC ANEMIA: Status: RESOLVED | Noted: 2019-11-26 | Resolved: 2021-08-25

## 2021-08-25 PROBLEM — D70.0 CONGENITAL NEUTROPENIA (HCC): Status: ACTIVE | Noted: 2021-08-25

## 2021-08-25 PROBLEM — D53.9 MACROCYTIC ANEMIA: Status: ACTIVE | Noted: 2019-11-26

## 2021-08-25 PROBLEM — E03.9 HYPOTHYROIDISM: Status: ACTIVE | Noted: 2021-08-25

## 2021-08-25 PROBLEM — S32.599A INFERIOR PUBIC RAMUS FRACTURE (HCC): Status: RESOLVED | Noted: 2020-08-03 | Resolved: 2021-08-25

## 2021-08-25 PROBLEM — D68.9 DISORDER OF HEMOSTASIS (HCC): Status: ACTIVE | Noted: 2021-08-25

## 2021-08-25 PROBLEM — E78.2 MIXED HYPERLIPIDEMIA: Status: ACTIVE | Noted: 2021-08-25

## 2021-08-25 PROCEDURE — G0155 HHCP-SVS OF CSW,EA 15 MIN: HCPCS

## 2021-08-26 ENCOUNTER — HOME CARE VISIT (OUTPATIENT)
Dept: HOME HEALTH SERVICES | Facility: HOME HEALTHCARE | Age: 86
End: 2021-08-26
Payer: MEDICARE

## 2021-08-26 VITALS
OXYGEN SATURATION: 97 % | RESPIRATION RATE: 17 BRPM | DIASTOLIC BLOOD PRESSURE: 60 MMHG | HEART RATE: 74 BPM | SYSTOLIC BLOOD PRESSURE: 132 MMHG | TEMPERATURE: 97.4 F

## 2021-08-26 PROCEDURE — G0156 HHCP-SVS OF AIDE,EA 15 MIN: HCPCS

## 2021-08-26 ASSESSMENT — ENCOUNTER SYMPTOMS
PAIN SEVERITY GOAL: 0/10
DIFFICULTY THINKING: 1
DENIES PAIN: 1
PERSON REPORTING PAIN: CAREGIVER
HIGHEST PAIN SEVERITY IN PAST 24 HOURS: 0/10
LOWEST PAIN SEVERITY IN PAST 24 HOURS: 0/10

## 2021-08-27 ENCOUNTER — HOME CARE VISIT (OUTPATIENT)
Dept: HOME HEALTH SERVICES | Facility: HOME HEALTHCARE | Age: 86
End: 2021-08-27
Payer: MEDICARE

## 2021-08-27 VITALS
TEMPERATURE: 98.2 F | HEART RATE: 88 BPM | OXYGEN SATURATION: 96 % | SYSTOLIC BLOOD PRESSURE: 100 MMHG | DIASTOLIC BLOOD PRESSURE: 62 MMHG | RESPIRATION RATE: 16 BRPM

## 2021-08-27 PROCEDURE — G0493 RN CARE EA 15 MIN HH/HOSPICE: HCPCS

## 2021-08-27 ASSESSMENT — ENCOUNTER SYMPTOMS
DENIES PAIN: 1
POOR JUDGMENT: 1
MUSCLE WEAKNESS: 1
DIFFICULTY THINKING: 1

## 2021-08-28 PROBLEM — Z91.81 AT RISK FOR FALLS: Status: ACTIVE | Noted: 2021-08-28

## 2021-09-01 ENCOUNTER — HOME CARE VISIT (OUTPATIENT)
Dept: HOME HEALTH SERVICES | Facility: HOME HEALTHCARE | Age: 86
End: 2021-09-01
Payer: MEDICARE

## 2021-09-01 PROCEDURE — G0155 HHCP-SVS OF CSW,EA 15 MIN: HCPCS

## 2021-09-01 PROCEDURE — 665001 SOC-HOME HEALTH

## 2021-09-02 ENCOUNTER — HOME CARE VISIT (OUTPATIENT)
Dept: HOME HEALTH SERVICES | Facility: HOME HEALTHCARE | Age: 86
End: 2021-09-02
Payer: MEDICARE

## 2021-09-02 PROCEDURE — G0493 RN CARE EA 15 MIN HH/HOSPICE: HCPCS

## 2021-09-02 ASSESSMENT — ACTIVITIES OF DAILY LIVING (ADL)
OASIS_M1830: 06
HOME_HEALTH_OASIS: 01

## 2021-09-02 ASSESSMENT — PATIENT HEALTH QUESTIONNAIRE - PHQ9: CLINICAL INTERPRETATION OF PHQ2 SCORE: 0

## 2021-09-02 ASSESSMENT — ENCOUNTER SYMPTOMS: DIFFICULTY THINKING: 1

## 2021-09-03 ENCOUNTER — HOME CARE VISIT (OUTPATIENT)
Dept: HOME HEALTH SERVICES | Facility: HOME HEALTHCARE | Age: 86
End: 2021-09-03
Payer: MEDICARE

## 2021-09-03 PROCEDURE — G0156 HHCP-SVS OF AIDE,EA 15 MIN: HCPCS

## 2021-09-03 NOTE — CASE COMMUNICATION
Quality Review for 9/2/21 [place] OASIS by ROBSON Robison, RN on  September 3, 2021      Edits completed by ROBSON Robison RN:  1.  C is NA per care plan  2.  is NA, there is no documentation of a clinically significant medication issue identified

## 2021-09-04 NOTE — CASE COMMUNICATION
I agree with changes  ----- Message -----  From: Ninoska Robison R.N.  Sent: 9/3/2021  10:47 AM PDT  To: Portia Baptiste R.N.      Quality Review for 9/2/21 [place] OASIS by ROBSON Robison, RN on  September 3, 2021      Edits completed by ROBSON Robison, RN:  1.  C is NA per care plan  2.  is NA, there is no documentation of a clinically significant medication issue identified

## 2021-11-16 ENCOUNTER — HOSPITAL ENCOUNTER (OUTPATIENT)
Dept: LAB | Facility: MEDICAL CENTER | Age: 86
End: 2021-11-16
Attending: FAMILY MEDICINE
Payer: MEDICARE

## 2021-11-16 LAB
ALBUMIN SERPL BCP-MCNC: 3.6 G/DL (ref 3.2–4.9)
ALBUMIN/GLOB SERPL: 1 G/DL
ALP SERPL-CCNC: 83 U/L (ref 30–99)
ALT SERPL-CCNC: 10 U/L (ref 2–50)
ANION GAP SERPL CALC-SCNC: 9 MMOL/L (ref 7–16)
AST SERPL-CCNC: 13 U/L (ref 12–45)
BASOPHILS # BLD AUTO: 0.4 % (ref 0–1.8)
BASOPHILS # BLD: 0.02 K/UL (ref 0–0.12)
BILIRUB SERPL-MCNC: 0.4 MG/DL (ref 0.1–1.5)
BUN SERPL-MCNC: 31 MG/DL (ref 8–22)
CALCIUM SERPL-MCNC: 9.1 MG/DL (ref 8.5–10.5)
CHLORIDE SERPL-SCNC: 106 MMOL/L (ref 96–112)
CO2 SERPL-SCNC: 26 MMOL/L (ref 20–33)
CREAT SERPL-MCNC: 0.63 MG/DL (ref 0.5–1.4)
EOSINOPHIL # BLD AUTO: 0.16 K/UL (ref 0–0.51)
EOSINOPHIL NFR BLD: 3 % (ref 0–6.9)
ERYTHROCYTE [DISTWIDTH] IN BLOOD BY AUTOMATED COUNT: 47.4 FL (ref 35.9–50)
EST. AVERAGE GLUCOSE BLD GHB EST-MCNC: 157 MG/DL
GLOBULIN SER CALC-MCNC: 3.7 G/DL (ref 1.9–3.5)
GLUCOSE SERPL-MCNC: 137 MG/DL (ref 65–99)
HBA1C MFR BLD: 7.1 % (ref 4–5.6)
HCT VFR BLD AUTO: 42.9 % (ref 37–47)
HGB BLD-MCNC: 14 G/DL (ref 12–16)
IMM GRANULOCYTES # BLD AUTO: 0.03 K/UL (ref 0–0.11)
IMM GRANULOCYTES NFR BLD AUTO: 0.6 % (ref 0–0.9)
LYMPHOCYTES # BLD AUTO: 1.62 K/UL (ref 1–4.8)
LYMPHOCYTES NFR BLD: 30.9 % (ref 22–41)
MCH RBC QN AUTO: 32.6 PG (ref 27–33)
MCHC RBC AUTO-ENTMCNC: 32.6 G/DL (ref 33.6–35)
MCV RBC AUTO: 100 FL (ref 81.4–97.8)
MONOCYTES # BLD AUTO: 0.44 K/UL (ref 0–0.85)
MONOCYTES NFR BLD AUTO: 8.4 % (ref 0–13.4)
NEUTROPHILS # BLD AUTO: 2.98 K/UL (ref 2–7.15)
NEUTROPHILS NFR BLD: 56.7 % (ref 44–72)
NRBC # BLD AUTO: 0 K/UL
NRBC BLD-RTO: 0 /100 WBC
PLATELET # BLD AUTO: 222 K/UL (ref 164–446)
PMV BLD AUTO: 10.9 FL (ref 9–12.9)
POTASSIUM SERPL-SCNC: 4.3 MMOL/L (ref 3.6–5.5)
PROT SERPL-MCNC: 7.3 G/DL (ref 6–8.2)
RBC # BLD AUTO: 4.29 M/UL (ref 4.2–5.4)
SODIUM SERPL-SCNC: 141 MMOL/L (ref 135–145)
TSH SERPL DL<=0.005 MIU/L-ACNC: 2.48 UIU/ML (ref 0.38–5.33)
WBC # BLD AUTO: 5.3 K/UL (ref 4.8–10.8)

## 2021-11-16 PROCEDURE — 84443 ASSAY THYROID STIM HORMONE: CPT

## 2021-11-16 PROCEDURE — 83036 HEMOGLOBIN GLYCOSYLATED A1C: CPT

## 2021-11-16 PROCEDURE — 36415 COLL VENOUS BLD VENIPUNCTURE: CPT

## 2021-11-16 PROCEDURE — 85025 COMPLETE CBC W/AUTO DIFF WBC: CPT

## 2021-11-16 PROCEDURE — 80053 COMPREHEN METABOLIC PANEL: CPT

## 2022-04-11 ENCOUNTER — APPOINTMENT (OUTPATIENT)
Dept: RADIOLOGY | Facility: MEDICAL CENTER | Age: 87
End: 2022-04-11
Attending: EMERGENCY MEDICINE
Payer: MEDICARE

## 2022-04-11 ENCOUNTER — HOSPITAL ENCOUNTER (OUTPATIENT)
Facility: MEDICAL CENTER | Age: 87
End: 2022-04-13
Attending: EMERGENCY MEDICINE | Admitting: HOSPITALIST
Payer: MEDICARE

## 2022-04-11 DIAGNOSIS — R26.2 INABILITY TO WALK: ICD-10-CM

## 2022-04-11 DIAGNOSIS — S39.012A STRAIN OF LUMBAR REGION, INITIAL ENCOUNTER: ICD-10-CM

## 2022-04-11 DIAGNOSIS — R26.2 UNABLE TO WALK: ICD-10-CM

## 2022-04-11 DIAGNOSIS — S70.01XA CONTUSION OF RIGHT HIP, INITIAL ENCOUNTER: ICD-10-CM
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
## 2022-04-11 DIAGNOSIS — I10 ESSENTIAL HYPERTENSION: ICD-10-CM

## 2022-04-11 DIAGNOSIS — H10.31 ACUTE BACTERIAL CONJUNCTIVITIS OF RIGHT EYE: ICD-10-CM

## 2022-04-11 LAB
ALBUMIN SERPL BCP-MCNC: 3.8 G/DL (ref 3.2–4.9)
ALBUMIN/GLOB SERPL: 1 G/DL
ALP SERPL-CCNC: 74 U/L (ref 30–99)
ALT SERPL-CCNC: 9 U/L (ref 2–50)
ANION GAP SERPL CALC-SCNC: 13 MMOL/L (ref 7–16)
APPEARANCE UR: CLEAR
AST SERPL-CCNC: 13 U/L (ref 12–45)
BASOPHILS # BLD AUTO: 0.4 % (ref 0–1.8)
BASOPHILS # BLD: 0.04 K/UL (ref 0–0.12)
BILIRUB SERPL-MCNC: 0.5 MG/DL (ref 0.1–1.5)
BILIRUB UR QL STRIP.AUTO: NEGATIVE
BUN SERPL-MCNC: 28 MG/DL (ref 8–22)
CALCIUM SERPL-MCNC: 9.2 MG/DL (ref 8.4–10.2)
CHLORIDE SERPL-SCNC: 104 MMOL/L (ref 96–112)
CO2 SERPL-SCNC: 24 MMOL/L (ref 20–33)
COLOR UR: YELLOW
CREAT SERPL-MCNC: 0.69 MG/DL (ref 0.5–1.4)
EOSINOPHIL # BLD AUTO: 0.17 K/UL (ref 0–0.51)
EOSINOPHIL NFR BLD: 1.7 % (ref 0–6.9)
ERYTHROCYTE [DISTWIDTH] IN BLOOD BY AUTOMATED COUNT: 45.7 FL (ref 35.9–50)
GFR SERPLBLD CREATININE-BSD FMLA CKD-EPI: 80 ML/MIN/1.73 M 2
GLOBULIN SER CALC-MCNC: 3.8 G/DL (ref 1.9–3.5)
GLUCOSE SERPL-MCNC: 160 MG/DL (ref 65–99)
GLUCOSE UR STRIP.AUTO-MCNC: 250 MG/DL
HCT VFR BLD AUTO: 42.7 % (ref 37–47)
HGB BLD-MCNC: 14 G/DL (ref 12–16)
IMM GRANULOCYTES # BLD AUTO: 0.07 K/UL (ref 0–0.11)
IMM GRANULOCYTES NFR BLD AUTO: 0.7 % (ref 0–0.9)
KETONES UR STRIP.AUTO-MCNC: 15 MG/DL
LEUKOCYTE ESTERASE UR QL STRIP.AUTO: NEGATIVE
LYMPHOCYTES # BLD AUTO: 1.73 K/UL (ref 1–4.8)
LYMPHOCYTES NFR BLD: 17.3 % (ref 22–41)
MCH RBC QN AUTO: 32 PG (ref 27–33)
MCHC RBC AUTO-ENTMCNC: 32.8 G/DL (ref 33.6–35)
MCV RBC AUTO: 97.7 FL (ref 81.4–97.8)
MICRO URNS: ABNORMAL
MONOCYTES # BLD AUTO: 0.93 K/UL (ref 0–0.85)
MONOCYTES NFR BLD AUTO: 9.3 % (ref 0–13.4)
NEUTROPHILS # BLD AUTO: 7.08 K/UL (ref 2–7.15)
NEUTROPHILS NFR BLD: 70.6 % (ref 44–72)
NITRITE UR QL STRIP.AUTO: NEGATIVE
NRBC # BLD AUTO: 0 K/UL
NRBC BLD-RTO: 0 /100 WBC
PH UR STRIP.AUTO: 6.5 [PH] (ref 5–8)
PLATELET # BLD AUTO: 224 K/UL (ref 164–446)
PMV BLD AUTO: 9.7 FL (ref 9–12.9)
POTASSIUM SERPL-SCNC: 4 MMOL/L (ref 3.6–5.5)
PROT SERPL-MCNC: 7.6 G/DL (ref 6–8.2)
PROT UR QL STRIP: NEGATIVE MG/DL
RBC # BLD AUTO: 4.37 M/UL (ref 4.2–5.4)
RBC UR QL AUTO: NEGATIVE
SODIUM SERPL-SCNC: 141 MMOL/L (ref 135–145)
SP GR UR STRIP.AUTO: 1.01
WBC # BLD AUTO: 10 K/UL (ref 4.8–10.8)

## 2022-04-11 PROCEDURE — 700117 HCHG RX CONTRAST REV CODE 255: Performed by: EMERGENCY MEDICINE

## 2022-04-11 PROCEDURE — 74177 CT ABD & PELVIS W/CONTRAST: CPT | Mod: ME

## 2022-04-11 PROCEDURE — 96375 TX/PRO/DX INJ NEW DRUG ADDON: CPT

## 2022-04-11 PROCEDURE — 70450 CT HEAD/BRAIN W/O DYE: CPT | Mod: ME

## 2022-04-11 PROCEDURE — 81003 URINALYSIS AUTO W/O SCOPE: CPT

## 2022-04-11 PROCEDURE — 99285 EMERGENCY DEPT VISIT HI MDM: CPT

## 2022-04-11 PROCEDURE — 73501 X-RAY EXAM HIP UNI 1 VIEW: CPT | Mod: RT

## 2022-04-11 PROCEDURE — 96374 THER/PROPH/DIAG INJ IV PUSH: CPT

## 2022-04-11 PROCEDURE — 700105 HCHG RX REV CODE 258: Performed by: EMERGENCY MEDICINE

## 2022-04-11 PROCEDURE — 72131 CT LUMBAR SPINE W/O DYE: CPT | Mod: ME

## 2022-04-11 PROCEDURE — 80053 COMPREHEN METABOLIC PANEL: CPT

## 2022-04-11 PROCEDURE — 71045 X-RAY EXAM CHEST 1 VIEW: CPT

## 2022-04-11 PROCEDURE — G0378 HOSPITAL OBSERVATION PER HR: HCPCS

## 2022-04-11 PROCEDURE — 99219 PR INITIAL OBSERVATION CARE,LEVL II: CPT | Performed by: HOSPITALIST

## 2022-04-11 PROCEDURE — 72125 CT NECK SPINE W/O DYE: CPT | Mod: ME

## 2022-04-11 PROCEDURE — 700111 HCHG RX REV CODE 636 W/ 250 OVERRIDE (IP): Performed by: EMERGENCY MEDICINE

## 2022-04-11 PROCEDURE — 85025 COMPLETE CBC W/AUTO DIFF WBC: CPT

## 2022-04-11 PROCEDURE — 36415 COLL VENOUS BLD VENIPUNCTURE: CPT

## 2022-04-11 RX ORDER — ONDANSETRON 4 MG/1
4 TABLET, ORALLY DISINTEGRATING ORAL EVERY 4 HOURS PRN
Status: DISCONTINUED | OUTPATIENT
Start: 2022-04-11 | End: 2022-04-13 | Stop reason: HOSPADM

## 2022-04-11 RX ORDER — SODIUM CHLORIDE 9 MG/ML
1000 INJECTION, SOLUTION INTRAVENOUS ONCE
Status: COMPLETED | OUTPATIENT
Start: 2022-04-11 | End: 2022-04-11

## 2022-04-11 RX ORDER — MULTIVIT-MIN/IRON/FOLIC ACID/K 18-600-40
1 CAPSULE ORAL DAILY
Status: DISCONTINUED | OUTPATIENT
Start: 2022-04-12 | End: 2022-04-11

## 2022-04-11 RX ORDER — ACETAMINOPHEN 325 MG/1
650 TABLET ORAL EVERY 6 HOURS PRN
Status: DISCONTINUED | OUTPATIENT
Start: 2022-04-11 | End: 2022-04-13 | Stop reason: HOSPADM

## 2022-04-11 RX ORDER — ONDANSETRON 2 MG/ML
4 INJECTION INTRAMUSCULAR; INTRAVENOUS ONCE
Status: COMPLETED | OUTPATIENT
Start: 2022-04-11 | End: 2022-04-11

## 2022-04-11 RX ORDER — MORPHINE SULFATE 4 MG/ML
4 INJECTION INTRAVENOUS ONCE
Status: COMPLETED | OUTPATIENT
Start: 2022-04-11 | End: 2022-04-11

## 2022-04-11 RX ORDER — BISACODYL 10 MG
10 SUPPOSITORY, RECTAL RECTAL
Status: DISCONTINUED | OUTPATIENT
Start: 2022-04-11 | End: 2022-04-13 | Stop reason: HOSPADM

## 2022-04-11 RX ORDER — ONDANSETRON 2 MG/ML
4 INJECTION INTRAMUSCULAR; INTRAVENOUS EVERY 4 HOURS PRN
Status: DISCONTINUED | OUTPATIENT
Start: 2022-04-11 | End: 2022-04-13 | Stop reason: HOSPADM

## 2022-04-11 RX ORDER — POLYETHYLENE GLYCOL 3350 17 G/17G
1 POWDER, FOR SOLUTION ORAL
Status: DISCONTINUED | OUTPATIENT
Start: 2022-04-11 | End: 2022-04-13 | Stop reason: HOSPADM

## 2022-04-11 RX ORDER — TRAMADOL HYDROCHLORIDE 50 MG/1
50 TABLET ORAL 4 TIMES DAILY PRN
Status: DISCONTINUED | OUTPATIENT
Start: 2022-04-11 | End: 2022-04-13 | Stop reason: HOSPADM

## 2022-04-11 RX ORDER — AMOXICILLIN 250 MG
2 CAPSULE ORAL 2 TIMES DAILY
Status: DISCONTINUED | OUTPATIENT
Start: 2022-04-11 | End: 2022-04-13 | Stop reason: HOSPADM

## 2022-04-11 RX ORDER — MIRTAZAPINE 15 MG/1
7.5 TABLET, FILM COATED ORAL
Status: DISCONTINUED | OUTPATIENT
Start: 2022-04-12 | End: 2022-04-12

## 2022-04-11 RX ADMIN — MORPHINE SULFATE 4 MG: 4 INJECTION INTRAVENOUS at 21:33

## 2022-04-11 RX ADMIN — IOHEXOL 80 ML: 350 INJECTION, SOLUTION INTRAVENOUS at 20:57

## 2022-04-11 RX ADMIN — ONDANSETRON 4 MG: 2 INJECTION INTRAMUSCULAR; INTRAVENOUS at 21:33

## 2022-04-11 RX ADMIN — SODIUM CHLORIDE 1000 ML: 9 INJECTION, SOLUTION INTRAVENOUS at 21:02

## 2022-04-11 ASSESSMENT — ENCOUNTER SYMPTOMS
BRUISES/BLEEDS EASILY: 0
VOMITING: 0
NECK PAIN: 0
SORE THROAT: 0
WEAKNESS: 0
BLURRED VISION: 0
FALLS: 1
HEADACHES: 0
COUGH: 0
INSOMNIA: 0
DEPRESSION: 0
PALPITATIONS: 0
FEVER: 0
NAUSEA: 0
DIZZINESS: 0
DOUBLE VISION: 0
MYALGIAS: 0
SHORTNESS OF BREATH: 0

## 2022-04-11 ASSESSMENT — PAIN DESCRIPTION - PAIN TYPE: TYPE: ACUTE PAIN

## 2022-04-11 ASSESSMENT — PAIN SCALES - WONG BAKER: WONGBAKER_NUMERICALRESPONSE: HURTS A WHOLE LOT

## 2022-04-11 ASSESSMENT — FIBROSIS 4 INDEX: FIB4 SCORE: 1.76

## 2022-04-12 PROBLEM — M47.819 FACET ARTHROPATHY: Status: ACTIVE | Noted: 2022-04-12

## 2022-04-12 PROBLEM — N94.89 ADNEXAL MASS: Status: ACTIVE | Noted: 2022-04-12

## 2022-04-12 LAB
ANION GAP SERPL CALC-SCNC: 10 MMOL/L (ref 7–16)
BUN SERPL-MCNC: 21 MG/DL (ref 8–22)
CALCIUM SERPL-MCNC: 8.8 MG/DL (ref 8.4–10.2)
CHLORIDE SERPL-SCNC: 106 MMOL/L (ref 96–112)
CO2 SERPL-SCNC: 24 MMOL/L (ref 20–33)
CREAT SERPL-MCNC: 0.55 MG/DL (ref 0.5–1.4)
ERYTHROCYTE [DISTWIDTH] IN BLOOD BY AUTOMATED COUNT: 46.1 FL (ref 35.9–50)
GFR SERPLBLD CREATININE-BSD FMLA CKD-EPI: 84 ML/MIN/1.73 M 2
GLUCOSE BLD STRIP.AUTO-MCNC: 109 MG/DL (ref 65–99)
GLUCOSE BLD STRIP.AUTO-MCNC: 145 MG/DL (ref 65–99)
GLUCOSE BLD STRIP.AUTO-MCNC: 146 MG/DL (ref 65–99)
GLUCOSE BLD STRIP.AUTO-MCNC: 216 MG/DL (ref 65–99)
GLUCOSE SERPL-MCNC: 157 MG/DL (ref 65–99)
HCT VFR BLD AUTO: 44.6 % (ref 37–47)
HGB BLD-MCNC: 14.3 G/DL (ref 12–16)
MCH RBC QN AUTO: 31.4 PG (ref 27–33)
MCHC RBC AUTO-ENTMCNC: 32.1 G/DL (ref 33.6–35)
MCV RBC AUTO: 98 FL (ref 81.4–97.8)
PLATELET # BLD AUTO: 196 K/UL (ref 164–446)
PMV BLD AUTO: 9.7 FL (ref 9–12.9)
POTASSIUM SERPL-SCNC: 4 MMOL/L (ref 3.6–5.5)
PREALB SERPL-MCNC: 12.5 MG/DL (ref 18–38)
RBC # BLD AUTO: 4.55 M/UL (ref 4.2–5.4)
SODIUM SERPL-SCNC: 140 MMOL/L (ref 135–145)
WBC # BLD AUTO: 10 K/UL (ref 4.8–10.8)

## 2022-04-12 PROCEDURE — 700102 HCHG RX REV CODE 250 W/ 637 OVERRIDE(OP): Performed by: HOSPITALIST

## 2022-04-12 PROCEDURE — A9270 NON-COVERED ITEM OR SERVICE: HCPCS | Performed by: FAMILY MEDICINE

## 2022-04-12 PROCEDURE — 92610 EVALUATE SWALLOWING FUNCTION: CPT

## 2022-04-12 PROCEDURE — G0378 HOSPITAL OBSERVATION PER HR: HCPCS

## 2022-04-12 PROCEDURE — 96372 THER/PROPH/DIAG INJ SC/IM: CPT

## 2022-04-12 PROCEDURE — 99224 PR SUBSEQUENT OBSERVATION CARE,LEVEL I: CPT | Performed by: FAMILY MEDICINE

## 2022-04-12 PROCEDURE — 80048 BASIC METABOLIC PNL TOTAL CA: CPT

## 2022-04-12 PROCEDURE — 36415 COLL VENOUS BLD VENIPUNCTURE: CPT

## 2022-04-12 PROCEDURE — 82962 GLUCOSE BLOOD TEST: CPT

## 2022-04-12 PROCEDURE — 97162 PT EVAL MOD COMPLEX 30 MIN: CPT

## 2022-04-12 PROCEDURE — 84134 ASSAY OF PREALBUMIN: CPT

## 2022-04-12 PROCEDURE — A9270 NON-COVERED ITEM OR SERVICE: HCPCS | Performed by: HOSPITALIST

## 2022-04-12 PROCEDURE — 85027 COMPLETE CBC AUTOMATED: CPT

## 2022-04-12 PROCEDURE — 97165 OT EVAL LOW COMPLEX 30 MIN: CPT

## 2022-04-12 PROCEDURE — 700102 HCHG RX REV CODE 250 W/ 637 OVERRIDE(OP): Performed by: FAMILY MEDICINE

## 2022-04-12 RX ORDER — MIRTAZAPINE 15 MG/1
7.5 TABLET, ORALLY DISINTEGRATING ORAL
Status: DISCONTINUED | OUTPATIENT
Start: 2022-04-12 | End: 2022-04-13 | Stop reason: HOSPADM

## 2022-04-12 RX ADMIN — INSULIN HUMAN 2 UNITS: 100 INJECTION, SOLUTION PARENTERAL at 21:06

## 2022-04-12 RX ADMIN — SENNOSIDES AND DOCUSATE SODIUM 2 TABLET: 50; 8.6 TABLET ORAL at 05:50

## 2022-04-12 RX ADMIN — SENNOSIDES AND DOCUSATE SODIUM 2 TABLET: 50; 8.6 TABLET ORAL at 17:01

## 2022-04-12 RX ADMIN — INSULIN HUMAN 2 UNITS: 100 INJECTION, SOLUTION PARENTERAL at 06:04

## 2022-04-12 RX ADMIN — TRAMADOL HYDROCHLORIDE 50 MG: 50 TABLET, COATED ORAL at 05:59

## 2022-04-12 RX ADMIN — INSULIN HUMAN 4 UNITS: 100 INJECTION, SOLUTION PARENTERAL at 16:59

## 2022-04-12 RX ADMIN — MIRTAZAPINE 7.5 MG: 15 TABLET, ORALLY DISINTEGRATING ORAL at 21:09

## 2022-04-12 ASSESSMENT — COGNITIVE AND FUNCTIONAL STATUS - GENERAL
DRESSING REGULAR UPPER BODY CLOTHING: A LITTLE
HELP NEEDED FOR BATHING: A LOT
STANDING UP FROM CHAIR USING ARMS: A LOT
EATING MEALS: A LITTLE
TURNING FROM BACK TO SIDE WHILE IN FLAT BAD: A LITTLE
MOBILITY SCORE: 15
MOVING FROM LYING ON BACK TO SITTING ON SIDE OF FLAT BED: A LITTLE
TOILETING: A LOT
DAILY ACTIVITIY SCORE: 14
SUGGESTED CMS G CODE MODIFIER DAILY ACTIVITY: CL
HELP NEEDED FOR BATHING: A LOT
PERSONAL GROOMING: TOTAL
SUGGESTED CMS G CODE MODIFIER MOBILITY: CK
WALKING IN HOSPITAL ROOM: A LOT
CLIMB 3 TO 5 STEPS WITH RAILING: A LOT
MOVING TO AND FROM BED TO CHAIR: A LITTLE
MOBILITY SCORE: 17
MOVING TO AND FROM BED TO CHAIR: A LITTLE
SUGGESTED CMS G CODE MODIFIER MOBILITY: CK
MOVING FROM LYING ON BACK TO SITTING ON SIDE OF FLAT BED: A LITTLE
DAILY ACTIVITIY SCORE: 13
EATING MEALS: A LITTLE
SUGGESTED CMS G CODE MODIFIER DAILY ACTIVITY: CK
STANDING UP FROM CHAIR USING ARMS: A LITTLE
DRESSING REGULAR LOWER BODY CLOTHING: A LOT
PERSONAL GROOMING: A LOT
TOILETING: A LOT
CLIMB 3 TO 5 STEPS WITH RAILING: A LOT
WALKING IN HOSPITAL ROOM: A LITTLE
DRESSING REGULAR LOWER BODY CLOTHING: A LOT
TURNING FROM BACK TO SIDE WHILE IN FLAT BAD: A LITTLE
DRESSING REGULAR UPPER BODY CLOTHING: A LITTLE

## 2022-04-12 ASSESSMENT — PAIN SCALES - PAIN ASSESSMENT IN ADVANCED DEMENTIA (PAINAD)
TOTALSCORE: 0
FACIALEXPRESSION: SMILING OR INEXPRESSIVE
BODYLANGUAGE: RELAXED
BODYLANGUAGE: RELAXED
CONSOLABILITY: NO NEED TO CONSOLE
CONSOLABILITY: NO NEED TO CONSOLE
BREATHING: NORMAL
TOTALSCORE: 0
FACIALEXPRESSION: SMILING OR INEXPRESSIVE
BREATHING: NORMAL

## 2022-04-12 ASSESSMENT — LIFESTYLE VARIABLES
HAVE PEOPLE ANNOYED YOU BY CRITICIZING YOUR DRINKING: NO
TOTAL SCORE: 0
TOTAL SCORE: 0
AVERAGE NUMBER OF DAYS PER WEEK YOU HAVE A DRINK CONTAINING ALCOHOL: 0
HAVE YOU EVER FELT YOU SHOULD CUT DOWN ON YOUR DRINKING: NO
ALCOHOL_USE: NO
EVER HAD A DRINK FIRST THING IN THE MORNING TO STEADY YOUR NERVES TO GET RID OF A HANGOVER: NO
ON A TYPICAL DAY WHEN YOU DRINK ALCOHOL HOW MANY DRINKS DO YOU HAVE: 0
EVER FELT BAD OR GUILTY ABOUT YOUR DRINKING: NO
HOW MANY TIMES IN THE PAST YEAR HAVE YOU HAD 5 OR MORE DRINKS IN A DAY: 0
CONSUMPTION TOTAL: NEGATIVE
TOTAL SCORE: 0

## 2022-04-12 ASSESSMENT — PAIN DESCRIPTION - PAIN TYPE: TYPE: ACUTE PAIN

## 2022-04-12 ASSESSMENT — PAIN SCALES - WONG BAKER: WONGBAKER_NUMERICALRESPONSE: HURTS JUST A LITTLE BIT

## 2022-04-12 ASSESSMENT — PATIENT HEALTH QUESTIONNAIRE - PHQ9
2. FEELING DOWN, DEPRESSED, IRRITABLE, OR HOPELESS: NOT AT ALL
SUM OF ALL RESPONSES TO PHQ9 QUESTIONS 1 AND 2: 0
1. LITTLE INTEREST OR PLEASURE IN DOING THINGS: NOT AT ALL

## 2022-04-12 ASSESSMENT — GAIT ASSESSMENTS
DEVIATION: SHUFFLED GAIT;BRADYKINETIC
ASSISTIVE DEVICE: FRONT WHEEL WALKER
DISTANCE (FEET): 40
GAIT LEVEL OF ASSIST: MINIMAL ASSIST

## 2022-04-12 ASSESSMENT — ACTIVITIES OF DAILY LIVING (ADL): TOILETING: UNABLE TO DETERMINE AT THIS TIME

## 2022-04-12 NOTE — PROGRESS NOTES
[facility] Medicine Daily Progress Note    Date of Service  4/12/2022    Chief Complaint  Daniel Aleman is a 89 y.o. female admitted 4/11/2022 with fall    Hospital Course  Patient is unable to provide History due to underlying h/o dementia. Poor historian and no family member by the bedside on admission. History obtained from ER records and conversation with ERP.      Daniel Aleman is a 89 y.o. female, with history HLD, Type II DM, Dementia and severe protein caloric malnutrition secondary to Dysphagia, who at baseline has gait instability and poor mobility, able to walk a few steps with 's help,  who cares for her.     She was brought to the ED this evening on 4/11/2022 for evaluation of right hip pain. Patient had unwitnessed fall in the kitchen yesterday.  heard her fall and she was trying to stand up. During the day today, She is having increasing pain on her hip to the point that it is significantly limiting her ability to move around.. At baseline, can only walk a few steps. Unclear if had head trauma but no LOC. She is not on blood thinners.      Extensive imaging evaluation done in the ED including CT Head and did not demonstrated any fractures, but patient is unable to ambulate in the ED and  feels he can't care for her, reason why I was called for admission.     Interval Problem Update  4/12 - Pt doing well today, is alert and oriented to self only but denies any pain. CM working on placement. No current acute medical issues.     I have personally seen and examined the patient at bedside. I discussed the plan of care with patient, family and bedside RN.    Consultants/Specialty  none    Code Status  Full Code    Disposition  Patient is medically cleared for discharge.   Anticipate discharge to to skilled nursing facility.  I have placed the appropriate orders for post-discharge needs.    Review of Systems  Review of Systems   Unable to perform ROS: Dementia   All other systems reviewed  and are negative.       Physical Exam  Temp:  [36.4 °C (97.6 °F)-36.9 °C (98.4 °F)] 36.4 °C (97.6 °F)  Pulse:  [] 83  Resp:  [12-18] 16  BP: (115-187)/(43-79) 130/55  SpO2:  [90 %-97 %] 90 %    Physical Exam  Vitals and nursing note reviewed.   Constitutional:       Comments: Frail     HENT:      Head: Normocephalic and atraumatic.      Mouth/Throat:      Mouth: Mucous membranes are moist.   Cardiovascular:      Rate and Rhythm: Normal rate and regular rhythm.   Pulmonary:      Effort: Pulmonary effort is normal.      Breath sounds: Normal breath sounds.   Abdominal:      General: Abdomen is flat.      Palpations: Abdomen is soft.   Skin:     General: Skin is warm.   Neurological:      Mental Status: She is alert.      Comments: Alert and oriented to self only    Psychiatric:         Mood and Affect: Mood normal.         Behavior: Behavior normal.         Fluids    Intake/Output Summary (Last 24 hours) at 4/12/2022 1344  Last data filed at 4/12/2022 1200  Gross per 24 hour   Intake 60 ml   Output --   Net 60 ml       Laboratory  Recent Labs     04/11/22 1931 04/12/22  0242   WBC 10.0 10.0   RBC 4.37 4.55   HEMOGLOBIN 14.0 14.3   HEMATOCRIT 42.7 44.6   MCV 97.7 98.0*   MCH 32.0 31.4   MCHC 32.8* 32.1*   RDW 45.7 46.1   PLATELETCT 224 196   MPV 9.7 9.7     Recent Labs     04/11/22 1931 04/12/22  0242   SODIUM 141 140   POTASSIUM 4.0 4.0   CHLORIDE 104 106   CO2 24 24   GLUCOSE 160* 157*   BUN 28* 21   CREATININE 0.69 0.55   CALCIUM 9.2 8.8                   Imaging  CT-HEAD W/O   Final Result         1.  No acute intracranial process.      2. Diffuse atrophy and periventricular white matter changes consistent with chronic small vessel disease.      3. Left occipital encephalomalacia      CT-CSPINE WITHOUT PLUS RECONS   Final Result         1.  Multilevel degenerative changes of the cervical spine limit diagnostic sensitivity of this examination   2.  Mild anterolisthesis C4 on C5, associated severe facet  arthrosis at this level is observed, changes likely related to degenerative listhesis however traumatic listhesis could have radiographically similar appearance. Otherwise no acute traumatic bony    injury of the cervical spine is apparent.   3.  Atherosclerosis      CT-ABDOMEN-PELVIS WITH   Final Result      1.  No solid organ injury is identified.      2.  Osteopenia. No acute fracture is appreciated.      3.  Bilateral nephrolithiasis.      4.  Interval enlargement in the right adnexal mass of the cystic component. Cystic neoplasm cannot be excluded.      5.  Cholelithiasis      6.  Small hiatal hernia.      7.  Diverticulosis. Moderate to large amount of colonic stool.      8.  Atherosclerosis      CT-LSPINE W/O PLUS RECONS   Final Result      1.  No acute fracture is identified.      2.  Osteopenia      DX-CHEST-PORTABLE (1 VIEW)   Final Result      No evidence of acute cardiopulmonary process.      DX-HIP-UNILATERAL-WITH PELVIS-1 VIEW RIGHT   Final Result      1.  No RIGHT hip or pelvic fracture.   2.  Moderate degenerative change of both hips.   3.  Diffuse osteopenia.           Assessment/Plan  * Unable to walk- (present on admission)  Assessment & Plan  -Observation status on Medical floor  -Had ground level fall the day prior and decreased mobility ever since. No Fractures seen on extensive imaging  -Pain control PRN and PT/OT eval, pt to dc to SNF when accepted   -At baseline, limited/ unstable motion  -  unable to care for her at home currently     Facet arthropathy  Assessment & Plan  - Discussed with Radiology - no spinal cord issues, appears to just have age related degenerative arthropathy     Adnexal mass  Assessment & Plan  - Made pt's  aware who states he will follow up with pt's PCP  - Pt's  does not wish to further pursue further intervention given her age and intervention, which is reasonable       Severe protein-calorie malnutrition (HCC)- (present on  admission)  Assessment & Plan  - BMI of 17.58  - Secondary to dementia   - Prealbumin 12.5    BMI less than 19,adult- (present on admission)  Assessment & Plan  Underlying dementia, dysphagia and severe protein caloric malnutrition    Advanced directives, counseling/discussion- (present on admission)  Assessment & Plan  - Discussed at length with pt's , who wishes for her to be DNR/DNI       Type 2 diabetes mellitus without complication, without long-term current use of insulin (HCC)- (present on admission)  Assessment & Plan  -Hold Metformin and start RISS.    Dementia (HCC)- (present on admission)  Assessment & Plan  -Underlying dementia at baseline.  cares for her at home.   -Continue Mirtazapine, unclear if she has aggressive behavior.   -At risk for delirium.        VTE prophylaxis: SCDs/TEDs    I have performed a physical exam and reviewed and updated ROS and Plan today (4/12/2022). In review of yesterday's note (4/11/2022), there are no changes except as documented above.

## 2022-04-12 NOTE — DISCHARGE PLANNING
Anticipated Discharge Disposition: Anticipate SNF     Action: Chart review completed. Pt A&Ox1 on RA. 6 clicks scores of 13/15.      Barriers to Discharge: medical clearance, likely placement     Plan:  f/u with medical team to discuss DC needs and barriers

## 2022-04-12 NOTE — ASSESSMENT & PLAN NOTE
-Observation status on Medical floor  -Had ground level fall the day prior and decreased mobility ever since. No Fractures seen on extensive imaging  -Pain control PRN and PT/OT eval, pt to dc to SNF when accepted   -At baseline, limited/ unstable motion  -  unable to care for her at home currently

## 2022-04-12 NOTE — PROGRESS NOTES
Spoke with  Scott, plans to be at bedside around 1100.   stating having a difficult time taking care of patient due to mobility.  Has a 4 wheel walker at home.

## 2022-04-12 NOTE — ASSESSMENT & PLAN NOTE
-Underlying dementia at baseline.  cares for her at home.   -Continue Mirtazapine, unclear if she has aggressive behavior.   -At risk for delirium.

## 2022-04-12 NOTE — DISCHARGE PLANNING
Anticipated Discharge Disposition: SNF, then home with spouse    Action: Discussed pt in rounds. Per MD, pt will be needing placement. PT/OT recommended pa placement. Per RN, pt is oriented time self only.     1145: Per RN, pt's spouse Scott is at bedside. LSW met with Scott at bedside. Scott verified address on face sheet and states he also takes care of his son with early onset dementia. Scott states pt stays downstairs (about 20 steps total). Per Scott, pt was previously ambulatory with walker and also has commode. Per Scott he can be reached on his cell at [phone].     Scott is agreeable to SNF placement. Scott gave verbal consent to send referrals to 1) [facility] 2) [facility] and 3) [facility]. LSW faxed choice to [facility].     LSW able to look up PASRR on file. PASRR: [id number]    Barriers to Discharge: SNF placement    Plan: LSW to follow and assist as needed.      Care Transition Team Assessment    Information Source  Orientation Level: Oriented to person  Information Given By: Spouse  Informant's Name: Scott  Who is responsible for making decisions for patient? : Legal next of kin  Name(s) of Primary Decision Maker: Scott Aleman [phone]    Readmission Evaluation  Is this a readmission?: No    Elopement Risk  Legal Hold: No  Ambulatory or Self Mobile in Wheelchair: No-Not an Elopement Risk  Elopement Risk: Not at Risk for Elopement    Interdisciplinary Discharge Planning  Primary Care Physician: Daisy Espinoza MD  Lives with - Patient's Self Care Capacity: Spouse  Housing / Facility: Unable To Determine At This Time  Prior Services: Unable To Determine At This Time  Durable Medical Equipment: Walker    Discharge Preparedness  What is your plan after discharge?: Skilled nursing facility  What are your discharge supports?: Spouse  Prior Functional Level: Ambulatory  Difficulity with ADLs: Bathing,Dressing  Difficulty with ADLs Comment: spouse assists  Difficulity with IADLs: Driving,Keeping  track of finances,Laundry,Shopping  Difficulity with IADL Comments: spouse assists    Functional Assesment  Prior Functional Level: Ambulatory    Finances  Financial Barriers to Discharge: No              Advance Directive  Advance Directive?: None    Domestic Abuse  Have you ever been the victim of abuse or violence?: No    Psychological Assessment  History of Substance Abuse: None    Discharge Risks or Barriers  Discharge risks or barriers?: No  Patient risk factors: Vulnerable adult    Anticipated Discharge Information  Discharge Disposition: D/T to SNF with Medicare cert in anticipation of skilled care (03)

## 2022-04-12 NOTE — PROGRESS NOTES
Pt received from ED. Pt was not able to transfer from [facility] to bed on her own; slide board used. No signs of injury to body noted. Slight discoloration of skin noted in gluteal fold. Pt is incontinent but not signs of pressure ulcer noted, all areas are blachable. Pt is confused and speaking word salad at times, but follows commands and occasionally answerers questions. VS WNR, O2 is 98 on 1 L but was not able to titrate off as 02 would drop to 87%. Pt is resting with eyes closed, no grimacing or signs of distress.     Chart check complete

## 2022-04-12 NOTE — CARE PLAN
The patient is Stable - Low risk of patient condition declining or worsening    Shift Goals  Clinical Goals: moitor for falls through shift  Patient Goals: comfort    Progress made toward(s) clinical / shift goals:    Problem: Pain - Standard  Goal: Alleviation of pain or a reduction in pain to the patient’s comfort goal  Outcome: Progressing     Problem: Knowledge Deficit - Standard  Goal: Patient and family/care givers will demonstrate understanding of plan of care, disease process/condition, diagnostic tests and medications  Outcome: Progressing     Problem: Skin Integrity  Goal: Skin integrity is maintained or improved  Outcome: Progressing     Problem: Fall Risk  Goal: Patient will remain free from falls  Outcome: Progressing       Patient is not progressing towards the following goals:

## 2022-04-12 NOTE — DIETARY
Nutrition services: Day 0 of admit.  Daniel Aleman is a 89 y.o. female with admitting DX of Unable to walk.    Consult received for BMI<19, severe protein-calorie malnutrition on dx list. Pt w/ dementia, A&O x 1 per chart notes, not appropriate to interview. No family members at bedside: RD attempted to call pt's , Scott, for pt's wt/POx; no answer to call. Per ED MD note, pt eating and drinking normally.    Assessment:  Height: 152.4 cm (5')  Weight: 40.8 kg (90 lb) - Stated weight  Body mass index is 17.58 kg/m²., BMI classification: Underweight  Diet/Intake: Cardiac, <25% x 1 meal    Evaluation:   1. PMHx includes T2DM, protein-calorie malnutrition 2/2 dysphagia, macrocytic anemia. Presents s/p GLF w/ R hip pain.  2. Seen by SLP, diet downgraded to Level 5 - minced/moist, Level 0 - thin liquids.  3. Labs: glu 157, pre-alb 12.5, A1c 7.1% (11/2021)  4. MAR: SSI, remeron, senna.  5. Last BM: none doc  6. No documented wounds or edema  7. Wt hx per chart review shows severe 18% wt loss in 2 months - question if previous wts in chart review stated.  · 06/23/21: 110.2 lb  · 08/12/21: 90 lb  · Admit stated wt same as 08/2021 wt    Malnutrition Risk: Unable to fully assess at this time: follow up w/ pt's  for PO hx, obtain measured wt this admit.    Recommendations/Plan:  1. Provide Boost glucose control BID w/ meals to bolster nutritional intake (per Poor PO Intake Protocol: Pt w/ BMI<18.5); wt gain likely desireable given underweight status.  2. Encourage intake of meals and supplements.  3. Document intake of all PO as % taken in ADL's to provide interdisciplinary communication across all shifts.   4. Obtain measured weight for this admit - communicated to nursing team.  5. Nutrition rep will continue to see patient for ongoing meal and snack preferences.     RD following.

## 2022-04-12 NOTE — THERAPY
Physical Therapy   Initial Evaluation     Patient Name: Daniel Aleman  Age:  89 y.o., Sex:  female  Medical Record #: [id number]  Today's Date: 4/12/2022     Precautions  Precautions: Fall Risk;Swallow Precautions ( See Comments)  Comments: hx of dementia    Assessment  Patient is 89 y.o. female s/p fall, weakness, spouse is unable to care for pt.  Pt is requiring Cathy for ambulation with FWW, limited activity tolerance. Pts cognition will be a limiting factor affecting pts progress but given her generalized weakness, may benefit from SNF for further therapy.      Plan    Recommend Physical Therapy 3 times per week until therapy goals are met for the following treatments:  Bed Mobility, Gait Training, Neuro Re-Education / Balance, Therapeutic Activities, and Therapeutic Exercises    DC Equipment Recommendations: Unable to determine at this time  Discharge Recommendations: Recommend post-acute placement for additional physical therapy services prior to discharge home        04/12/22 0910   Prior Living Situation   Prior Services Unable To Determine At This Time   Housing / Facility Unable To Determine At This Time   Equipment Owned Unable to Determine At This Time   Lives with - Patient's Self Care Capacity Spouse   Comments Per chart pt lives with spouse, pt unable to provide any info on home or PLOF   Prior Level of Functional Mobility   Comments Per chart pt was minimally ambulatory, unsure if she used a walker   History of Falls   History of Falls Yes   Cognition    Level of Consciousness Alert   New Learning Impaired   Comments Pt is oriented to name only, pleasant and able to follow simple commands   Passive ROM Lower Body   Passive ROM Lower Body Not Tested   Active ROM Lower Body    Active ROM Lower Body  WDL   Strength Lower Body   Lower Body Strength  X   Gross Strength Generalized Weakness, Equal Bilaterally   Balance Assessment   Sitting Balance (Static) Fair   Sitting Balance (Dynamic) Fair   Standing  Balance (Static) Fair -   Standing Balance (Dynamic) Fair -   Weight Shift Sitting Poor   Weight Shift Standing Fair   Comments stdg with FWW   Gait Analysis   Gait Level Of Assist Minimal Assist   Assistive Device Front Wheel Walker   Distance (Feet) 40   # of Times Distance was Traveled 1   Deviation Shuffled Gait;Bradykinetic   Bed Mobility    Supine to Sit Minimal Assist   Functional Mobility   Sit to Stand Contact Guard Assist   Bed, Chair, Wheelchair Transfer Minimal Assist   Transfer Method Stand Step  (with FWW)   Activity Tolerance   Standing 3-4 min   Short Term Goals    Short Term Goal # 1 Pt will be able to perform bed mobility and sup <> sit Janina in  6visits.   Short Term Goal # 2 Pt will be able to perform sit <>stand and transfer Janina in 6 visits.   Short Term Goal # 3 Pt will be able to ambulate 50 ft with FWW Janina in 6 visits.

## 2022-04-12 NOTE — ASSESSMENT & PLAN NOTE
- Discussed with Radiology - no spinal cord issues, appears to just have age related degenerative arthropathy

## 2022-04-12 NOTE — ASSESSMENT & PLAN NOTE
- Made pt's  aware who states he will follow up with pt's PCP  - Pt's  does not wish to further pursue further intervention given her age and intervention, which is reasonable

## 2022-04-12 NOTE — PROGRESS NOTES
Received report from NOC RN.  Assumed patient care at 0700.  Patient resting comfortably, no signs of distress. Bed alarm on, bed in low and locked position, call light within reach. Hourly rounding in place.

## 2022-04-12 NOTE — PROGRESS NOTES
4 Eyes Skin Assessment Completed by [facility] [place] and Nicolette RN.    Head WDL  Ears WDL  Nose WDL  Mouth WDL  Neck WDL  Breast/Chest WDL  Shoulder Blades WDL  Spine WDL  (R) Arm/Elbow/Hand WDL  (L) Arm/Elbow/Hand WDL  Abdomen WDL  Groin Redness  Scrotum/Coccyx/Buttocks Redness  (R) Leg WDL  (L) Leg WDL  (R) Heel/Foot/Toe WDL  (L) Heel/Foot/Toe WDL          Devices In Places Blood Pressure Cuff      Interventions In Place N/A    Possible Skin Injury No    Pictures Uploaded Into Epic N/A  Wound Consult Placed N/A  RN Wound Prevention Protocol Ordered No

## 2022-04-12 NOTE — ED TRIAGE NOTES
Pt's  reports she moans with any movement or when he tries to carry her around  Pt c/o pain sitting in triage

## 2022-04-12 NOTE — H&P
Hospital Medicine History & Physical Note    Date of Service  4/11/2022    Primary Care Physician  Daisy Espinoza M.D.    Consultants  None    Code Status  Full Code: I was unable to verify with family CODE STATUS on admission. DNR on 6/2021 and Full code on 4/22. No POLST on file. I attempted to call her  but unable to talk with him over the phone     Chief Complaint  Chief Complaint   Patient presents with   • T-5000 GLF     Occurred Saturday Injured rt hip and back   Pt has dementia and at baseline can only take a few steps x past year  Per family she forgets she can't walk and falls       History of Presenting Illness  Patient is unable to provide History due to underlying h/o dementia. Poor historian and no family member by the bedside on admission. History obtained from ER records and conversation with ERP.     Daniel Aleman is a 89 y.o. female, with history HLD, Type II DM, Dementia and severe protein caloric malnutrition secondary to Dysphagia, who at baseline has gait instability and poor mobility, able to walk a few steps with 's help,  who cares for her.    She was brought to the ED this evening on 4/11/2022 for evaluation of right hip pain. Patient had unwitnessed fall in the kitchen yesterday.  heard her fall and she was trying to stand up. During the day today, She is having increasing pain on her hip to the point that it is significantly limiting her ability to move around.. At baseline, can only walk a few steps. Unclear if had head trauma but no LOC. She is not on blood thinners.     Extensive imaging evaluation done in the ED including CT Head and did not demonstrated any fractures, but patient is unable to ambulate in the ED and  feels he can't care for her, reason why I was called for admission.     I discussed the plan of care with patient.    Review of Systems  Review of Systems   Constitutional: Negative for fever.   HENT: Negative for congestion and sore throat.     Eyes: Negative for blurred vision and double vision.   Respiratory: Negative for cough and shortness of breath.    Cardiovascular: Negative for chest pain and palpitations.   Gastrointestinal: Negative for nausea and vomiting.   Genitourinary: Negative for dysuria and urgency.   Musculoskeletal: Positive for falls and joint pain. Negative for myalgias and neck pain.   Skin: Negative for itching and rash.   Neurological: Negative for dizziness, weakness and headaches.   Endo/Heme/Allergies: Does not bruise/bleed easily.   Psychiatric/Behavioral: Negative for depression. The patient does not have insomnia.        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    Surgical History   has a past surgical history that includes gyn surgery and primary c section.     Family History  family history is not on file.   Family history reviewed with patient. There is no family history that is pertinent to the chief complaint.     Social History   reports that she has never smoked. She has never used smokeless tobacco. She reports that she does not drink alcohol and does not use drugs.    Allergies  Allergies   Allergen Reactions   • Meclizine Unspecified   • Promethazine Unspecified   • Metoclopramide        Medications  Prior to Admission Medications   Prescriptions Last Dose Informant Patient Reported? Taking?   Acetaminophen (TYLENOL PO)   Yes No   Sig: Take 500 mg by mouth 2 times a day as needed (Moderare pain).   Multiple Vitamins-Minerals (MULTI FOR HER 50+ PO)   Yes No   Sig: Take 1 tablet by mouth every day. Indications: supplement   metFORMIN (GLUCOPHAGE) 500 MG Tab  Family Member Yes No   Sig: Take 500 mg by mouth every day.   mirtazapine (REMERON) 15 MG Tab   No No   Sig: Take 0.5  Tablets by mouth at bedtime.   traMADol HCl (ULTRAM PO)   Yes No   Sig: Take 50 mg by mouth 4 times a day as needed (sever pain).   Patient not taking: Reported on 8/20/2021      Facility-Administered Medications: None       Physical Exam  Temp:  [36.9 °C (98.4 °F)] 36.9 °C (98.4 °F)  Pulse:  [75-78] 75  Resp:  [12-16] 12  BP: (159-187)/(73-79) 187/73  SpO2:  [93 %-96 %] 93 %  Blood Pressure : (!) 187/73   Temperature: 36.9 °C (98.4 °F)   Pulse: 75   Respiration: 12   Pulse Oximetry: 93 %       Physical Exam  Constitutional:       Appearance: Normal appearance.   HENT:      Head: Normocephalic and atraumatic.      Mouth/Throat:      Mouth: Mucous membranes are moist.      Pharynx: Oropharynx is clear.   Eyes:      Extraocular Movements: Extraocular movements intact.      Pupils: Pupils are equal, round, and reactive to light.   Cardiovascular:      Rate and Rhythm: Normal rate and regular rhythm.      Heart sounds: Normal heart sounds.   Pulmonary:      Effort: Pulmonary effort is normal.      Breath sounds: Normal breath sounds.   Abdominal:      General: Abdomen is flat. Bowel sounds are normal.      Palpations: Abdomen is soft.   Musculoskeletal:      Cervical back: Normal range of motion and neck supple.      Comments: Limited ROM on right side due to pain   Skin:     General: Skin is warm and dry.   Neurological:      General: No focal deficit present.      Mental Status: She is alert and oriented to person, place, and time.   Psychiatric:         Mood and Affect: Mood normal.         Behavior: Behavior normal.         Laboratory:  Recent Labs     04/11/22 1931   WBC 10.0   RBC 4.37   HEMOGLOBIN 14.0   HEMATOCRIT 42.7   MCV 97.7   MCH 32.0   MCHC 32.8*   RDW 45.7   PLATELETCT 224   MPV 9.7     Recent Labs     04/11/22 1931   SODIUM 141   POTASSIUM 4.0   CHLORIDE 104   CO2 24   GLUCOSE 160*   BUN 28*   CREATININE 0.69   CALCIUM 9.2     Recent Labs     04/11/22 1931   ALTSGPT 9   ASTSGOT 13   ALKPHOSPHAT 74    TBILIRUBIN 0.5   GLUCOSE 160*         No results for input(s): NTPROBNP in the last 72 hours.      No results for input(s): TROPONINT in the last 72 hours.    Imaging:  CT-HEAD W/O   Final Result         1.  No acute intracranial process.      2. Diffuse atrophy and periventricular white matter changes consistent with chronic small vessel disease.      3. Left occipital encephalomalacia      CT-CSPINE WITHOUT PLUS RECONS   Final Result         1.  Multilevel degenerative changes of the cervical spine limit diagnostic sensitivity of this examination   2.  Mild anterolisthesis C4 on C5, associated severe facet arthrosis at this level is observed, changes likely related to degenerative listhesis however traumatic listhesis could have radiographically similar appearance. Otherwise no acute traumatic bony    injury of the cervical spine is apparent.   3.  Atherosclerosis      CT-ABDOMEN-PELVIS WITH   Final Result      1.  No solid organ injury is identified.      2.  Osteopenia. No acute fracture is appreciated.      3.  Bilateral nephrolithiasis.      4.  Interval enlargement in the right adnexal mass of the cystic component. Cystic neoplasm cannot be excluded.      5.  Cholelithiasis      6.  Small hiatal hernia.      7.  Diverticulosis. Moderate to large amount of colonic stool.      8.  Atherosclerosis      CT-LSPINE W/O PLUS RECONS   Final Result      1.  No acute fracture is identified.      2.  Osteopenia      DX-CHEST-PORTABLE (1 VIEW)   Final Result      No evidence of acute cardiopulmonary process.      DX-HIP-UNILATERAL-WITH PELVIS-1 VIEW RIGHT   Final Result      1.  No RIGHT hip or pelvic fracture.   2.  Moderate degenerative change of both hips.   3.  Diffuse osteopenia.              Assessment/Plan:  I anticipate this patient is appropriate for observation status at this time.    * Unable to walk- (present on admission)  Assessment & Plan  -Observation status on Medical floor  -Had ground level fall the  day prior and decreased mobility ever since. No Fractures seen  -Pain control PRN and PT/OT evaluation in am  -At baseline, limited/ unstable motion    Type 2 diabetes mellitus without complication, without long-term current use of insulin (HCC)- (present on admission)  Assessment & Plan  -Hold Metformin and start RISS.    Severe protein-calorie malnutrition (HCC)- (present on admission)  Assessment & Plan  -Added prealbumin levels, need to clarify what is her eating regimen with family    BMI less than 19,adult- (present on admission)  Assessment & Plan  Underlying dementia, dysphagia and severe protein caloric malnutrition    Advanced directives, counseling/discussion- (present on admission)  Assessment & Plan  -Full Code for now, will need to address this with family in am  -I was unable to verify with family CODE STATUS on admission. DNR on 6/2021 and Full code on 4/22. No POLST on file. I attempted to call her  but unable to talk with him over the phone     Dementia (HCC)- (present on admission)  Assessment & Plan  -Underlying dementia at baseline.  cares for her at home.   -Continue Mirtazapine, unclear if she has aggressive behavior.   -At risk for delirium.       VTE prophylaxis: SCDs/TEDs

## 2022-04-12 NOTE — THERAPY
"Speech Language Pathology   Clinical Swallow Evaluation     Patient Name: Daniel Aleman  AGE:  89 y.o., SEX:  female  Medical Record #: [id number]  Today's Date: 4/12/2022     Precautions  Precautions: (P) Fall Risk,Swallow Precautions ( See Comments)  Comments: (P) hx of dementia    HPI:  89 y/o admitted on 4/11 for GLF. Last seen by SLP in June 2021 where a minced and moist/thin liquid diet was recommended. Dx chest found \"   No evidence of acute cardiopulmonary process.\" CT of head found \"No acute intracranial process,  Left occipital encephalomalacia.\"    PMHx:  HLD, type 2 DM, dementia, severe protein caloric malnutrition 2/2 dysphagia      Level of Consciousness: Alert  Affect/Behavior: Appropriate  Follows Directives: Inconsistent  Orientation: Self  Hearing: Functional hearing  Vision: Functional vision      Prior Living Situation & Level of Function:  According to EMR, pt lives at home with  and has hx of dementia.      Oral Mechanism Evaluation  Facial Symmetry: reduced L sided movement  Labial Observations: WFL  Lingual Observations: Pt did not follow commands for assessment  Dentition: Poor, Scattered dentition  Comments:      Voice  Quality: WFL  Resonance: WFL  Intensity: Soft  Cough: did not assess  Comments:      Current Method of Nutrition   Oral diet (regular/thin liquid)         Assessment  Positioning: Bed - Chair Position  Bolus Administration: Patient  Oxygen Requirements: Room Air  Factor(s) Affecting Performance: None    Swallowing Trials  Ice: Not tested  Thin Liquid (TN0): WFL  Mildly Thick Liquid (MT2): Not tested  Liquidised (LQ3): WFL  Pureed (PU4): WFL  Minced & Moist (MM5): WFL  Soft & Bite Sized (SB6): Not tested  Easy to Chew (EC7): Not tested  Regular (RG7): Not tested    Comments:  Per OT, pt had difficulty with mastication of soft solids as noted by pocketing. Pt with missing and broken teeth but denied difficulty chewing. No overt s/sx of aspiration with trials of thin " "liquids via cup sip and straw sip, apple sauce, pudding, and minced and moist texture. Slightly prolonged but functional mastication of minced and moist texture. When asked if softer foods were easier to eat than eggs/potatoes on her breakfast plate she said \"yes\".       Clinical Impressions  Pt is having difficulty with mastication of soft solid and solid textures, however, more timely mastication with minced and moist textures.        Recommendations  1.  Downgrade to minced and moist/thin liquids  2.  Instrumentation: None indicated at this time  3.  Swallowing Instructions & Precautions:   Supervision: Distant supervision - check on patient 2-3 times per meal  Positioning: Fully upright and midline during oral intake  Medication: Whole with liquid, Whole with puree  Strategies: Small bites/sips  Oral Care: BID      Plan    Recommend Speech Therapy 3 times per week until therapy goals are met for the following treatments:  Dysphagia Training and Patient / Family / Caregiver Education.    Discharge Recommendations: (P) Recommend home health for continued speech therapy services       Objective       04/12/22 1028   Precautions   Precautions Fall Risk;Swallow Precautions ( See Comments)   Comments hx of dementia   Vitals   O2 (LPM) 0   O2 Delivery Device None - Room Air   Pain 0 - 10 Group   Therapist Pain Assessment Post Activity Pain Same as Prior to Activity;Nurse Notified;0   Prior Living Situation   Lives with - Patient's Self Care Capacity Spouse   Prior Level Of Function   Communication Impaired   Swallow Impaired   Dentition Poor Quality    Hearing Within Functional Limits for Evaluation   Vision Within Functional Limits for Evaluation   Patient's Primary Language English   Occupation (Pre-Hospital Vocational) Not Employed   Patient / Family Goals   Patient / Family Goal #1 when asked if it was easier to chew softer foods vs regular diet she said \"yes\"   Short Term Goals   Short Term Goal # 1 Pt will " consume a MM5/TN0 diet with no overt s/sx of aspiration   Education Group   Education Provided Dysphagia   Dysphagia Patient Response Patient;Acceptance;Explanation;Verbal Demonstration;Reinforcement Needed   Anticipated Discharge Needs   Discharge Recommendations Recommend home health for continued speech therapy services   Therapy Recommendations Upon DC Dysphagia Training;Community Re-Integration;Patient / Family / Caregiver Education   Interdisciplinary Plan of Care Collaboration   IDT Collaboration with  Nursing   Patient Position at End of Therapy Seated;In Bed;Call Light within Reach;Tray Table within Reach

## 2022-04-12 NOTE — DISCHARGE PLANNING
Received Choice form at 1213  Agency/Facility Name: [facility]  Referral sent per Choice form @ 1221     @[id number]  Agency/Facility Name: [facility]  Spoke To: Gaurav  Outcome: Per Gaurav, pt has been accepted as long as she can D/C home.

## 2022-04-12 NOTE — ED PROVIDER NOTES
"ED Provider Note  CHIEF COMPLAINT  Chief Complaint   Patient presents with   • T-5000 GLF     Occurred Saturday Injured rt hip and back   Pt has dementia and at baseline can only take a few steps x past year  Per family she forgets she can't walk and falls       HPI  Daniel Aleman is a 89 y.o. female who presents to the ER with complaint of right hip and right-sided low back pain after ground-level fall 2 days ago at home.  Patient's  states she has dementia.  Sometimes she forgets she cannot walk well and she falls.  He said he had to run upstairs to grab something.  She was sitting downstairs at the table.  As soon as he got prison up the stairs, he heard a thump, turned around, and found her on the ground.  He says she does not walk well at baseline.  She typically requires assistance with walker or  with the help of her  to get around.  He says that since the fall she has had an even more difficult time getting around.  He says he is trying to help her, but every time she weightbears, she complains of pain in her right hip and right back.  He said she was fine until she fell.  Immediately after the fall she had pain in her right hip and low back.  She is a history of a pubic ramus fracture back in August 2020.  He does not think she hit her head.  She is not on any blood thinners.  She has not complained of any neck pain or rib pain.  No abdominal pain.  She has been eating and drinking normally.  No blood in urine.  History is provided by  as patient has dementia.  He does, however, say that she is able to accurately localize her pain and tell people if she is having discomfort.    REVIEW OF SYSTEMS  See HPI for further details. All other systems are negative.    PAST MEDICAL HISTORY  Past Medical History:   Diagnosis Date   • Congenital neutropenia (HCC) 8/25/2021   • Diabetes (HCC)     \" pre diabeteic \"   • Disorder of hemostasis (HCC) 8/25/2021   • High cholesterol    • Macrocytic " anemia 2019   • Right inferior pubic ramus fracture (HCC) 8/3/2020   • Trigeminal neuralgia        FAMILY HISTORY  Family History   Problem Relation Age of Onset   • Heart Disease Neg Hx        SOCIAL HISTORY  Social History     Socioeconomic History   • Marital status:    Tobacco Use   • Smoking status: Never Smoker   • Smokeless tobacco: Never Used   Vaping Use   • Vaping Use: Never used   Substance and Sexual Activity   • Alcohol use: No   • Drug use: No       SURGICAL HISTORY  Past Surgical History:   Procedure Laterality Date   • GYN SURGERY       x 3   • PRIMARY C SECTION             CURRENT MEDICATIONS  Home Medications    **Home medications have not yet been reviewed for this encounter**         ALLERGIES  Allergies   Allergen Reactions   • Meclizine Unspecified   • Promethazine Unspecified   • Metoclopramide        PHYSICAL EXAM  VITAL SIGNS: /79   Pulse 78   Temp 36.9 °C (98.4 °F) (Temporal)   Resp 16   Ht 1.524 m (5')   Wt 40.8 kg (90 lb)   SpO2 96%   BMI 17.58 kg/m²      Constitutional: Well developed, well nourished; No acute distress; Non-toxic appearance.   HENT: Normocephalic, atraumatic; Bilateral external ears normal; slightly dry mucous membranes   Eyes: PERRL, EOMI, Conjunctiva normal. No discharge.   Neck:  Supple, nontender midline; No stridor; No nuchal rigidity.   Lymphatic: No cervical lymphadenopathy noted.   Cardiovascular: Regular rate and rhythm without murmurs, rubs, or gallop.   Thorax & Lungs: No respiratory distress, breath sounds clear to auscultation bilaterally without wheezing, rales or rhonchi. Nontender chest wall. No crepitus or subcutaneous air  Abdomen: Soft, nontender, bowel sounds normal. No obvious masses; No pulsatile masses; no rebound, guarding, or peritoneal signs.   Skin: Good color; warm and dry without rash or petechia.  Back: No tenderness with palpation of the T-spine or L-spine.  No bruising.  There is some mild  tenderness with palpation of the right lower lumbar paraspinous muscles and sacrum.  Extremities: Distal radial, dorsalis pedis, posterior tibial pulses are equal bilaterally; No edema; Nontender calves or saphenous, No cyanosis, No clubbing.   Musculoskeletal: Good range of motion in all major joints. No tenderness to palpation or major deformities noted.  No tenderness with palpation of the right hip or iliac crest.  There is no shortening or rotation of either leg.  No pain in the hips with flexion, extension, internal or external rotation.  Neurologic: Alert & oriented x 4, clear speech, patient able to elevate bilateral legs up off of the bed without any difficulty.  Nontender femurs, knees, tip/fib's, and ankles.      RADIOLOGY/PROCEDURES  CT-HEAD W/O   Final Result         1.  No acute intracranial process.      2. Diffuse atrophy and periventricular white matter changes consistent with chronic small vessel disease.      3. Left occipital encephalomalacia      CT-CSPINE WITHOUT PLUS RECONS   Final Result         1.  Multilevel degenerative changes of the cervical spine limit diagnostic sensitivity of this examination   2.  Mild anterolisthesis C4 on C5, associated severe facet arthrosis at this level is observed, changes likely related to degenerative listhesis however traumatic listhesis could have radiographically similar appearance. Otherwise no acute traumatic bony    injury of the cervical spine is apparent.   3.  Atherosclerosis      CT-ABDOMEN-PELVIS WITH   Final Result      1.  No solid organ injury is identified.      2.  Osteopenia. No acute fracture is appreciated.      3.  Bilateral nephrolithiasis.      4.  Interval enlargement in the right adnexal mass of the cystic component. Cystic neoplasm cannot be excluded.      5.  Cholelithiasis      6.  Small hiatal hernia.      7.  Diverticulosis. Moderate to large amount of colonic stool.      8.  Atherosclerosis      CT-LSPINE W/O PLUS RECONS   Final  Result      1.  No acute fracture is identified.      2.  Osteopenia      DX-CHEST-PORTABLE (1 VIEW)   Final Result      No evidence of acute cardiopulmonary process.      DX-HIP-UNILATERAL-WITH PELVIS-1 VIEW RIGHT   Final Result      1.  No RIGHT hip or pelvic fracture.   2.  Moderate degenerative change of both hips.   3.  Diffuse osteopenia.          COURSE & MEDICAL DECISION MAKING  Pertinent Labs & Imaging studies reviewed. (See chart for details)    Review of patient's old records reveal that in August 2020 a 4.2 cm dermoid cyst was found on her right ovary.  Today the ovarian mass measures 4.4 cm.  I advised the  the patient will need to have this followed up.  He agrees to speak with Dr. Espinoza about this finding as she will need follow-up with gynecology.    2200: Discussed with Dr. Day, hospitalist on-call.  She will kindly evaluate the patient hospitalization.      Patient presents to the ER complaining of right hip and right low back pain after ground-level fall 2 days ago.  Patient's , who is her caregiver, says she has dementia.  She often forgets that she cannot walk well.  She got up from the table 2 nights ago without assistance and fell.  She immediately complained of pain in her right hip and right low back.  Since then she has had a hard time getting to the bathroom and getting around.  The , who typically is able to get her around by assisting her in by helping her with a walker, has been unable to get her to the bathroom, etc. since the fall.  He says patient is able to localize pain well.  She is pretty reliable when it comes to this.  She is consistently described pain in the right hip and right low back.  She has not complained of pain anywhere else over the last couple days per her .  Her only complaint of pain here today is her right hip and low back.  He does not think she hit her head.  No LOC.  She is been acting at her baseline per .  CT brain was  performed since she does have dementia and had a fall.  CT brain is negative for any acute pathology.  CT C-spine is negative for any acute fracture.  She does not have any rib tenderness.  Chest x-ray is negative.  Initial hip x-ray was negative.  For this reason she underwent a CT scan of the abdomen /pelvis evaluate for occult fracture.  No evidence of pelvic or lumbar spine fracture.   says that he is unable to care for her at home since she is having a hard time getting around.  She will need PT/OT evaluation for inability to ambulate.  I spoke with Dr. Day, hospitalist on-call, and she will kindly evaluate the patient hospitalization.    I verified that the patient was wearing a mask and I was wearing appropriate PPE every time I entered the room. The patient's mask was on the patient at all times during my encounter except for a brief view of the oropharynx.    FINAL IMPRESSION  1. Fall from ground level Acute    2. Contusion of right hip, initial encounter Acute    3. Strain of lumbar region, initial encounter Acute    4. Inability to walk Acute    .     This dictation has been created using voice recognition software. The accuracy of the dictation is limited by the abilities of the software. I expect there may be some errors of grammar and possibly content. I made every attempt to manually correct the errors within my dictation. However, errors related to voice recognition software may still exist and should be interpreted within the appropriate context.    Electronically signed by: Emilia Avila M.D., 4/11/2022 6:21 PM

## 2022-04-12 NOTE — CARE PLAN
Problem: Nutritional:  Goal: Achieve adequate nutritional intake  Description: Patient will consume 50% of meals and supplements.  Outcome: Not Met     See RD note.

## 2022-04-13 VITALS
OXYGEN SATURATION: 93 % | WEIGHT: 96.12 LBS | BODY MASS INDEX: 18.87 KG/M2 | RESPIRATION RATE: 20 BRPM | HEIGHT: 60 IN | HEART RATE: 70 BPM | TEMPERATURE: 98 F | DIASTOLIC BLOOD PRESSURE: 61 MMHG | SYSTOLIC BLOOD PRESSURE: 150 MMHG

## 2022-04-13 LAB — GLUCOSE BLD STRIP.AUTO-MCNC: 119 MG/DL (ref 65–99)

## 2022-04-13 PROCEDURE — 99217 PR OBSERVATION CARE DISCHARGE: CPT | Performed by: FAMILY MEDICINE

## 2022-04-13 PROCEDURE — A9270 NON-COVERED ITEM OR SERVICE: HCPCS | Performed by: HOSPITALIST

## 2022-04-13 PROCEDURE — 82962 GLUCOSE BLOOD TEST: CPT

## 2022-04-13 PROCEDURE — 700102 HCHG RX REV CODE 250 W/ 637 OVERRIDE(OP): Performed by: FAMILY MEDICINE

## 2022-04-13 PROCEDURE — 92526 ORAL FUNCTION THERAPY: CPT

## 2022-04-13 PROCEDURE — 700101 HCHG RX REV CODE 250: Performed by: FAMILY MEDICINE

## 2022-04-13 PROCEDURE — 700102 HCHG RX REV CODE 250 W/ 637 OVERRIDE(OP): Performed by: HOSPITALIST

## 2022-04-13 PROCEDURE — 94760 N-INVAS EAR/PLS OXIMETRY 1: CPT

## 2022-04-13 PROCEDURE — A9270 NON-COVERED ITEM OR SERVICE: HCPCS | Performed by: FAMILY MEDICINE

## 2022-04-13 PROCEDURE — G0378 HOSPITAL OBSERVATION PER HR: HCPCS

## 2022-04-13 RX ORDER — CIPROFLOXACIN HYDROCHLORIDE 3.5 MG/ML
1 SOLUTION/ DROPS TOPICAL
Qty: 2 ML | Refills: 0 | Status: SHIPPED
Start: 2022-04-13 | End: 2022-04-18

## 2022-04-13 RX ORDER — CIPROFLOXACIN HYDROCHLORIDE 3.5 MG/ML
1 SOLUTION/ DROPS TOPICAL
Status: DISCONTINUED | OUTPATIENT
Start: 2022-04-13 | End: 2022-04-13 | Stop reason: HOSPADM

## 2022-04-13 RX ORDER — AMLODIPINE BESYLATE 5 MG/1
2.5 TABLET ORAL
Status: DISCONTINUED | OUTPATIENT
Start: 2022-04-13 | End: 2022-04-13 | Stop reason: HOSPADM

## 2022-04-13 RX ORDER — AMLODIPINE BESYLATE 2.5 MG/1
2.5 TABLET ORAL DAILY
Qty: 30 TABLET | Refills: 0 | Status: SHIPPED
Start: 2022-04-14

## 2022-04-13 RX ADMIN — SENNOSIDES AND DOCUSATE SODIUM 2 TABLET: 50; 8.6 TABLET ORAL at 05:26

## 2022-04-13 RX ADMIN — AMLODIPINE BESYLATE 2.5 MG: 5 TABLET ORAL at 08:17

## 2022-04-13 RX ADMIN — CIPROFLOXACIN HYDROCHLORIDE 1 DROP: 3 SOLUTION/ DROPS OPHTHALMIC at 11:38

## 2022-04-13 ASSESSMENT — PAIN SCALES - PAIN ASSESSMENT IN ADVANCED DEMENTIA (PAINAD)
BREATHING: NORMAL
TOTALSCORE: 0
BODYLANGUAGE: RELAXED
CONSOLABILITY: NO NEED TO CONSOLE
FACIALEXPRESSION: SMILING OR INEXPRESSIVE

## 2022-04-13 ASSESSMENT — FIBROSIS 4 INDEX: FIB4 SCORE: 2.1

## 2022-04-13 ASSESSMENT — PAIN DESCRIPTION - PAIN TYPE: TYPE: ACUTE PAIN

## 2022-04-13 NOTE — DISCHARGE SUMMARY
Discharge Summary    CHIEF COMPLAINT ON ADMISSION  Chief Complaint   Patient presents with   • T-5000 GLF     Occurred Saturday Injured rt hip and back   Pt has dementia and at baseline can only take a few steps x past year  Per family she forgets she can't walk and falls       Reason for Admission  Fall, Hip Pain     Admission Date  4/11/2022    CODE STATUS  DNAR/DNI    HPI & HOSPITAL COURSE  This is a 89 y.o. female here with decreased mobility. She has a history of DM, HLD and dementia who was brought to the ER as her  is now unable to care for her at home.  She had some hip pain but extensive workup did not reveal any issue requiring intervention. During hospitalization, she had some elevated Bps for which we started very low dose Amlodipine, as well as a conjunctivitis for which we will give cipro drops - 2 drops in the right eye q2hr x 2 days, then q4hrs for an additional 5d. She is discharging to SNF.      Therefore, she is discharged in good and stable condition to home with close outpatient follow-up.    The patient met 2-midnight criteria for an inpatient stay at the time of discharge.    Discharge Date  4/13/22    FOLLOW UP ITEMS POST DISCHARGE  SNF     DISCHARGE DIAGNOSES  Principal Problem:    Unable to walk POA: Yes  Active Problems:    Dementia (HCC) POA: Yes    Type 2 diabetes mellitus without complication, without long-term current use of insulin (HCC) POA: Yes    Advanced directives, counseling/discussion POA: Yes    Mixed hyperlipidemia POA: Yes    BMI less than 19,adult POA: Yes    Severe protein-calorie malnutrition (HCC) POA: Yes    Adnexal mass POA: Unknown    Facet arthropathy POA: Unknown  Resolved Problems:    * No resolved hospital problems. *      FOLLOW UP  No future appointments.  No follow-up provider specified.    MEDICATIONS ON DISCHARGE     Medication List      START taking these medications      Instructions   amLODIPine 2.5 MG Tabs  Start taking on: April 14, 2022  Commonly  known as: NORVASC   Take 1 Tablet by mouth every day.  Dose: 2.5 mg     ciprofloxacin 0.3 % Soln  Commonly known as: CILOXIN   Administer 1 Drop into the right eye every 2 hours while awake for 5 days.  Dose: 1 Drop            Allergies  Allergies   Allergen Reactions   • Meclizine Unspecified   • Promethazine Unspecified   • Metoclopramide        DIET  Orders Placed This Encounter   Procedures   • Diet Order Diet: Level 5 - Minced and Moist; Liquid level: Level 0 - Thin; Second Modifier: (optional): Cardiac     Standing Status:   Standing     Number of Occurrences:   1     Order Specific Question:   Diet:     Answer:   Level 5 - Minced and Moist [24]     Order Specific Question:   Liquid level     Answer:   Level 0 - Thin     Order Specific Question:   Second Modifier: (optional)     Answer:   Cardiac [6]       ACTIVITY  As tolerated and directed by skilled nursing.  Weight bearing as tolerated    CONSULTATIONS  None    PROCEDURES  None    LABORATORY  Lab Results   Component Value Date    SODIUM 140 04/12/2022    POTASSIUM 4.0 04/12/2022    CHLORIDE 106 04/12/2022    CO2 24 04/12/2022    GLUCOSE 157 (H) 04/12/2022    BUN 21 04/12/2022    CREATININE 0.55 04/12/2022        Lab Results   Component Value Date    WBC 10.0 04/12/2022    HEMOGLOBIN 14.3 04/12/2022    HEMATOCRIT 44.6 04/12/2022    PLATELETCT 196 04/12/2022        Total time of the discharge process exceeds 31 minutes.

## 2022-04-13 NOTE — CARE PLAN
The patient is Stable - Low risk of patient condition declining or worsening    Shift Goals  Clinical Goals: no falls through the night  Patient Goals: rest and comfort    Progress made toward(s) clinical / shift goals:    Problem: Pain - Standard  Goal: Alleviation of pain or a reduction in pain to the patient’s comfort goal  Outcome: Progressing     Problem: Knowledge Deficit - Standard  Goal: Patient and family/care givers will demonstrate understanding of plan of care, disease process/condition, diagnostic tests and medications  Outcome: Progressing     Problem: Skin Integrity  Goal: Skin integrity is maintained or improved  Outcome: Progressing     Problem: Fall Risk  Goal: Patient will remain free from falls  Outcome: Progressing       Patient is not progressing towards the following goals:

## 2022-04-13 NOTE — DISCHARGE PLANNING
Anticipated Discharge Disposition:   [facility], [facility]     Action:     0945: RN CM superivisor spoke with Gaurav from [place]. They are able to take this patient today and are arranging transport for 1130. IP CM aware and let LSW know. Gaurav to send RN BRIANA supervisor an email once transport confirmed.    1037: Transport confirmed via email for 1230. Message sent to IP CM.      Barriers to Discharge:   none    Plan:   Hospital care management will continue to assist with discharge planning needs.

## 2022-04-13 NOTE — DISCHARGE PLANNING
Anticipated Discharge Disposition: [facility]    Action: LSW notified that pt has a bed at [facility] at 1230. LSW to complete packet.    LSW called and notified spouse, Scott. Scott gave consent for transfer.     Discussed pt in rounds. LSW requested d/c summary from MD.    Barriers to Discharge: d/c summary    Plan: LSW to follow up with d/c summary.    1153: LSW provided transfer packet to Charge RN. LSW notified RN, it needs d/c summary.    1215: LSW notified MD after meeting need d/c summary. Per MD, will place now. RN CM reminded RN to print d/c summary for packet.

## 2022-04-13 NOTE — THERAPY
"Speech Language Pathology  Daily Treatment     Patient Name: Daniel Aleman  Age:  89 y.o., Sex:  female  Medical Record #: [id number]  Today's Date: 4/13/2022     Precautions  Precautions: (P) Fall Risk,Swallow Precautions ( See Comments)  Comments: (P) hx of dementia    Assessment    Pt seen on this date for dysphagia therapy. Pt agreeable to therapy and was repositioined with help from CNA. She consumed minimal trials of her minced and moist (MM5)/thin liquid breakfast and medication whole with liquid wash from RN with no overt s/sx of aspiration. Cough x1 noted when HOB was lowered, but did not appear related to PO. Mild oral residue noted after the swallow with trials of puree which cleared with thin liquid wash. Pt began falling asleep while eating so further PO trials discontinued. At this time, recommend continuation of minced and moist (MM5)/thin liquid diet with implementation of swallowing compensatory strategies (small bites/sips, up at 90* during meals, slow rate, straws okay). Please discontinue PO with any s/sx of aspiration or lethargy.    Plan    1) recommend continuation of minced and moist (MM5)/thin liquid diet with implementation of swallowing compensatory strategies (small bites/sips, up at 90* during meals, slow rate, straws okay)    Continue current treatment plan.    Discharge Recommendations: (P) Recommend home health for continued speech therapy services       Objective       04/13/22 0837   Precautions   Precautions Fall Risk;Swallow Precautions ( See Comments)   Comments hx of dementia   Vitals   O2 (LPM) 0   O2 Delivery Device None - Room Air   Pain 0 - 10 Group   Therapist Pain Assessment Post Activity Pain Same as Prior to Activity;Nurse Notified;0   Patient / Family Goals   Patient / Family Goal #1 when asked if it was easier to chew softer foods vs regular diet she said \"yes\"   Goal #1 Outcome Progressing as expected   Short Term Goals   Short Term Goal # 1 Pt will consume a MM5/TN0 " diet with no overt s/sx of aspiration   Goal Outcome # 1 Progressing as expected   Education Group   Education Provided Dysphagia   Dysphagia Patient Response Patient;Acceptance;Explanation;Verbal Demonstration;Reinforcement Needed   Anticipated Discharge Needs   Discharge Recommendations Recommend home health for continued speech therapy services   Therapy Recommendations Upon DC Dysphagia Training;Community Re-Integration;Patient / Family / Caregiver Education   Interdisciplinary Plan of Care Collaboration   IDT Collaboration with  Nursing   Patient Position at End of Therapy Seated;In Bed;Call Light within Reach;Tray Table within Reach

## 2022-04-13 NOTE — CARE PLAN
The patient is Stable - Low risk of patient condition declining or worsening    Shift Goals  Clinical Goals: Pt free from falls this shift  Patient Goals: rest for at least 4 hours    Progress made toward(s) clinical / shift goals:  No faals this shift;  Resting quietly    Patient is not progressing towards the following goals:      Problem: Knowledge Deficit - Standard  Goal: Patient and family/care givers will demonstrate understanding of plan of care, disease process/condition, diagnostic tests and medications  Outcome: Not Progressing

## 2022-04-13 NOTE — DISCHARGE PLANNING
Agency/Facility Name: [facility]  Spoke To: Gaurav  Outcome: Per Gaurav, pt will D/C to [facility] at 1230  LSW notified    @1206  Agency/Facility Name: [facility]  Spoke To: Gaurav  Outcome: Per LSW, DPA contacted [facility] to ask if they are able to push back transport to 1400. Per Gaurav, she can't pt past 1500 and doesn't have any transport other than the 1230. DPA to follow up with new transport time.  LSW notified     Agency/Facility Name: [facility]  Spoke To: Gaurav  Outcome: Per MARSHALLW, M.D. is putting in D/C summary at this time so 1230 transport will still work. DPA contacted [facility] to update staff.  LSW notified

## 2022-04-13 NOTE — DISCHARGE INSTRUCTIONS
Discharge Instructions    Discharged to other by ambulance with escort. Discharged via ambulance, hospital escort: Yes.  Special equipment needed: Not Applicable    Be sure to schedule a follow-up appointment with your primary care doctor or any specialists as instructed.     Discharge Plan:   Diet Plan: Discussed  Activity Level: Discussed  Confirmed Follow up Appointment: Patient to Call and Schedule Appointment  Confirmed Symptoms Management: Discussed  Medication Reconciliation Updated: Yes    I understand that a diet low in cholesterol, fat, and sodium is recommended for good health. Unless I have been given specific instructions below for another diet, I accept this instruction as my diet prescription.   Other diet: soft minced    Special Instructions: None    · Is patient discharged on Warfarin / Coumadin?   No     Depression / Suicide Risk    As you are discharged from this [facility] facility, it is important to learn how to keep safe from harming yourself.    Recognize the warning signs:  · Abrupt changes in personality, positive or negative- including increase in energy   · Giving away possessions  · Change in eating patterns- significant weight changes-  positive or negative  · Change in sleeping patterns- unable to sleep or sleeping all the time   · Unwillingness or inability to communicate  · Depression  · Unusual sadness, discouragement and loneliness  · Talk of wanting to die  · Neglect of personal appearance   · Rebelliousness- reckless behavior  · Withdrawal from people/activities they love  · Confusion- inability to concentrate     If you or a loved one observes any of these behaviors or has concerns about self-harm, here's what you can do:  · Talk about it- your feelings and reasons for harming yourself  · Remove any means that you might use to hurt yourself (examples: pills, rope, extension cords, firearm)  · Get professional help from the community (Mental Health, Substance Abuse,  psychological counseling)  · Do not be alone:Call your Safe Contact- someone whom you trust who will be there for you.  · Call your local CRISIS HOTLINE 119-9344 or 050-925-4982  · Call your local Children's Mobile Crisis Response Team [place] (073) 886-9895 or www.[employer]  · Call the toll free National Suicide Prevention Hotlines   · National Suicide Prevention Lifeline 253-933-NGVV (2384)  · National Hope Line Network 800-SUICIDE (188-9100)

## 2022-04-13 NOTE — PROGRESS NOTES
Pt in bed when received. No signs of distress noted. No complaints made by patient. Call bell in reach.     Chart check completed

## 2022-04-13 NOTE — PROGRESS NOTES
I spoke with Scott pts spouse. He states pt is not taking any home meds now. He said she should be on Metformin but she has not been able to take it. Advised Dr. Otoole.

## 2024-02-16 NOTE — PROGRESS NOTES
Called [facility] and spoke with Nuvia MEYER at 1349.  Transport at bedside at 1355.  Discharge paperwork reviewed with pt's spouse over the telephone. Questions encouraged and answered. Patient discharged to [facility].   Pt off the floor at 1409.   English

## 2025-03-18 NOTE — ASSESSMENT & PLAN NOTE
Hypoxic requiring 2L O2 baseline is room air, possibly contributing to falls.  Unclear if this is acute on chronic.  Wean off O2 as tolerated  Follow-up on echocardiogram results   107.4